# Patient Record
Sex: MALE | Race: WHITE | NOT HISPANIC OR LATINO | Employment: UNEMPLOYED | URBAN - NONMETROPOLITAN AREA
[De-identification: names, ages, dates, MRNs, and addresses within clinical notes are randomized per-mention and may not be internally consistent; named-entity substitution may affect disease eponyms.]

---

## 2018-03-21 ENCOUNTER — HOSPITAL ENCOUNTER (EMERGENCY)
Facility: HOSPITAL | Age: 28
Discharge: STILL ADMITTED OR ADMINISTRATIVE | End: 2018-03-21
Attending: EMERGENCY MEDICINE

## 2018-03-21 ENCOUNTER — HOSPITAL ENCOUNTER (INPATIENT)
Facility: HOSPITAL | Age: 28
LOS: 5 days | Discharge: HOME | End: 2018-03-26
Attending: PSYCHIATRY & NEUROLOGY | Admitting: PSYCHIATRY & NEUROLOGY

## 2018-03-21 VITALS
HEART RATE: 88 BPM | DIASTOLIC BLOOD PRESSURE: 88 MMHG | OXYGEN SATURATION: 100 % | BODY MASS INDEX: 22.4 KG/M2 | SYSTOLIC BLOOD PRESSURE: 134 MMHG | TEMPERATURE: 98.4 F | HEIGHT: 71 IN | WEIGHT: 160 LBS | RESPIRATION RATE: 18 BRPM

## 2018-03-21 DIAGNOSIS — F19.10 SUBSTANCE ABUSE (HCC): Primary | ICD-10-CM

## 2018-03-21 LAB
6-ACETYL MORPHINE: NEGATIVE
ALBUMIN SERPL-MCNC: 4.2 G/DL (ref 3.5–5)
ALBUMIN/GLOB SERPL: 1.4 G/DL (ref 1.5–2.5)
ALP SERPL-CCNC: 55 U/L (ref 40–129)
ALT SERPL W P-5'-P-CCNC: 174 U/L (ref 10–44)
AMPHET+METHAMPHET UR QL: POSITIVE
ANION GAP SERPL CALCULATED.3IONS-SCNC: 5.8 MMOL/L (ref 3.6–11.2)
AST SERPL-CCNC: 69 U/L (ref 10–34)
BARBITURATES UR QL SCN: NEGATIVE
BENZODIAZ UR QL SCN: POSITIVE
BILIRUB SERPL-MCNC: 0.4 MG/DL (ref 0.2–1.8)
BILIRUB UR QL STRIP: NEGATIVE
BUN BLD-MCNC: 11 MG/DL (ref 7–21)
BUN/CREAT SERPL: 10.2 (ref 7–25)
BUPRENORPHINE SERPL-MCNC: POSITIVE NG/ML
CALCIUM SPEC-SCNC: 9.4 MG/DL (ref 7.7–10)
CANNABINOIDS SERPL QL: NEGATIVE
CHLORIDE SERPL-SCNC: 103 MMOL/L (ref 99–112)
CLARITY UR: CLEAR
CO2 SERPL-SCNC: 31.2 MMOL/L (ref 24.3–31.9)
COCAINE UR QL: NEGATIVE
COLOR UR: YELLOW
CREAT BLD-MCNC: 1.08 MG/DL (ref 0.43–1.29)
DEPRECATED RDW RBC AUTO: 39 FL (ref 37–54)
EOSINOPHIL # BLD MANUAL: 0.19 10*3/MM3 (ref 0–0.7)
EOSINOPHIL NFR BLD MANUAL: 4 % (ref 0–5)
ERYTHROCYTE [DISTWIDTH] IN BLOOD BY AUTOMATED COUNT: 12.3 % (ref 11.5–14.5)
ETHANOL BLD-MCNC: <10 MG/DL
ETHANOL UR QL: <0.01 %
GFR SERPL CREATININE-BSD FRML MDRD: 80 ML/MIN/1.73
GLOBULIN UR ELPH-MCNC: 3 GM/DL
GLUCOSE BLD-MCNC: 92 MG/DL (ref 70–110)
GLUCOSE UR STRIP-MCNC: NEGATIVE MG/DL
HCT VFR BLD AUTO: 43.2 % (ref 42–52)
HGB BLD-MCNC: 14.8 G/DL (ref 14–18)
HGB UR QL STRIP.AUTO: NEGATIVE
KETONES UR QL STRIP: NEGATIVE
LEUKOCYTE ESTERASE UR QL STRIP.AUTO: NEGATIVE
LYMPHOCYTES # BLD MANUAL: 2.13 10*3/MM3 (ref 1–3)
LYMPHOCYTES NFR BLD MANUAL: 44 % (ref 21–51)
LYMPHOCYTES NFR BLD MANUAL: 9 % (ref 0–10)
MAGNESIUM SERPL-MCNC: 2.2 MG/DL (ref 1.7–2.6)
MCH RBC QN AUTO: 30.5 PG (ref 27–33)
MCHC RBC AUTO-ENTMCNC: 34.3 G/DL (ref 33–37)
MCV RBC AUTO: 88.9 FL (ref 80–94)
METHADONE UR QL SCN: NEGATIVE
MONOCYTES # BLD AUTO: 0.44 10*3/MM3 (ref 0.1–0.9)
NEUTROPHILS # BLD AUTO: 2.08 10*3/MM3 (ref 1.4–6.5)
NEUTROPHILS NFR BLD MANUAL: 43 % (ref 30–70)
NITRITE UR QL STRIP: NEGATIVE
OPIATES UR QL: POSITIVE
OSMOLALITY SERPL CALC.SUM OF ELEC: 278.4 MOSM/KG (ref 273–305)
OXYCODONE UR QL SCN: NEGATIVE
PCP UR QL SCN: NEGATIVE
PH UR STRIP.AUTO: 7 [PH] (ref 5–8)
PLAT MORPH BLD: NORMAL
PLATELET # BLD AUTO: 190 10*3/MM3 (ref 130–400)
PMV BLD AUTO: 10.5 FL (ref 6–10)
POTASSIUM BLD-SCNC: 3.7 MMOL/L (ref 3.5–5.3)
PROT SERPL-MCNC: 7.2 G/DL (ref 6–8)
PROT UR QL STRIP: NEGATIVE
RBC # BLD AUTO: 4.86 10*6/MM3 (ref 4.7–6.1)
RBC MORPH BLD: NORMAL
SODIUM BLD-SCNC: 140 MMOL/L (ref 135–153)
SP GR UR STRIP: 1.01 (ref 1–1.03)
UROBILINOGEN UR QL STRIP: NORMAL
WBC NRBC COR # BLD: 4.84 10*3/MM3 (ref 4.5–12.5)

## 2018-03-21 PROCEDURE — 80307 DRUG TEST PRSMV CHEM ANLYZR: CPT | Performed by: PHYSICIAN ASSISTANT

## 2018-03-21 PROCEDURE — 80053 COMPREHEN METABOLIC PANEL: CPT | Performed by: PHYSICIAN ASSISTANT

## 2018-03-21 PROCEDURE — 93005 ELECTROCARDIOGRAM TRACING: CPT | Performed by: PSYCHIATRY & NEUROLOGY

## 2018-03-21 PROCEDURE — 85025 COMPLETE CBC W/AUTO DIFF WBC: CPT | Performed by: PHYSICIAN ASSISTANT

## 2018-03-21 PROCEDURE — HZ2ZZZZ DETOXIFICATION SERVICES FOR SUBSTANCE ABUSE TREATMENT: ICD-10-PCS | Performed by: PSYCHIATRY & NEUROLOGY

## 2018-03-21 PROCEDURE — 85007 BL SMEAR W/DIFF WBC COUNT: CPT | Performed by: PHYSICIAN ASSISTANT

## 2018-03-21 PROCEDURE — 83735 ASSAY OF MAGNESIUM: CPT | Performed by: PHYSICIAN ASSISTANT

## 2018-03-21 PROCEDURE — 81003 URINALYSIS AUTO W/O SCOPE: CPT | Performed by: PHYSICIAN ASSISTANT

## 2018-03-21 RX ORDER — LORAZEPAM 2 MG/1
2 TABLET ORAL EVERY 4 HOURS PRN
Status: DISPENSED | OUTPATIENT
Start: 2018-03-22 | End: 2018-03-23

## 2018-03-21 RX ORDER — TRAZODONE HYDROCHLORIDE 50 MG/1
50 TABLET ORAL NIGHTLY
Status: CANCELLED | OUTPATIENT
Start: 2018-03-21

## 2018-03-21 RX ORDER — ECHINACEA PURPUREA EXTRACT 125 MG
2 TABLET ORAL AS NEEDED
Status: DISCONTINUED | OUTPATIENT
Start: 2018-03-21 | End: 2018-03-26 | Stop reason: HOSPADM

## 2018-03-21 RX ORDER — NICOTINE 21 MG/24HR
1 PATCH, TRANSDERMAL 24 HOURS TRANSDERMAL
Status: DISCONTINUED | OUTPATIENT
Start: 2018-03-21 | End: 2018-03-26 | Stop reason: HOSPADM

## 2018-03-21 RX ORDER — CLONIDINE HYDROCHLORIDE 0.1 MG/1
0.1 TABLET ORAL 4 TIMES DAILY PRN
Status: ACTIVE | OUTPATIENT
Start: 2018-03-21 | End: 2018-03-22

## 2018-03-21 RX ORDER — ALUMINA, MAGNESIA, AND SIMETHICONE 2400; 2400; 240 MG/30ML; MG/30ML; MG/30ML
15 SUSPENSION ORAL EVERY 6 HOURS PRN
Status: DISCONTINUED | OUTPATIENT
Start: 2018-03-21 | End: 2018-03-26 | Stop reason: HOSPADM

## 2018-03-21 RX ORDER — CLONIDINE HYDROCHLORIDE 0.1 MG/1
0.1 TABLET ORAL ONCE AS NEEDED
Status: ACTIVE | OUTPATIENT
Start: 2018-03-25 | End: 2018-03-26

## 2018-03-21 RX ORDER — TRAZODONE HYDROCHLORIDE 50 MG/1
50 TABLET ORAL NIGHTLY PRN
Status: DISCONTINUED | OUTPATIENT
Start: 2018-03-21 | End: 2018-03-22 | Stop reason: DRUGHIGH

## 2018-03-21 RX ORDER — LORAZEPAM 1 MG/1
1 TABLET ORAL EVERY 4 HOURS PRN
Status: ACTIVE | OUTPATIENT
Start: 2018-03-24 | End: 2018-03-25

## 2018-03-21 RX ORDER — TRAZODONE HYDROCHLORIDE 50 MG/1
50 TABLET ORAL NIGHTLY
COMMUNITY

## 2018-03-21 RX ORDER — LOPERAMIDE HYDROCHLORIDE 2 MG/1
2 CAPSULE ORAL 4 TIMES DAILY PRN
Status: DISCONTINUED | OUTPATIENT
Start: 2018-03-21 | End: 2018-03-26 | Stop reason: HOSPADM

## 2018-03-21 RX ORDER — BENZONATATE 100 MG/1
100 CAPSULE ORAL 3 TIMES DAILY PRN
Status: DISCONTINUED | OUTPATIENT
Start: 2018-03-21 | End: 2018-03-26 | Stop reason: HOSPADM

## 2018-03-21 RX ORDER — LORAZEPAM 2 MG/1
2 TABLET ORAL
Status: COMPLETED | OUTPATIENT
Start: 2018-03-22 | End: 2018-03-22

## 2018-03-21 RX ORDER — LORAZEPAM 0.5 MG/1
0.5 TABLET ORAL EVERY 4 HOURS PRN
Status: ACTIVE | OUTPATIENT
Start: 2018-03-25 | End: 2018-03-26

## 2018-03-21 RX ORDER — IBUPROFEN 600 MG/1
600 TABLET ORAL EVERY 6 HOURS PRN
Status: DISCONTINUED | OUTPATIENT
Start: 2018-03-21 | End: 2018-03-26 | Stop reason: HOSPADM

## 2018-03-21 RX ORDER — CYCLOBENZAPRINE HCL 10 MG
10 TABLET ORAL 3 TIMES DAILY PRN
Status: DISCONTINUED | OUTPATIENT
Start: 2018-03-21 | End: 2018-03-26 | Stop reason: HOSPADM

## 2018-03-21 RX ORDER — BUSPIRONE HYDROCHLORIDE 15 MG/1
15 TABLET ORAL 2 TIMES DAILY
COMMUNITY

## 2018-03-21 RX ORDER — CLONIDINE HYDROCHLORIDE 0.1 MG/1
0.1 TABLET ORAL 4 TIMES DAILY PRN
Status: ACTIVE | OUTPATIENT
Start: 2018-03-22 | End: 2018-03-23

## 2018-03-21 RX ORDER — DICYCLOMINE HYDROCHLORIDE 10 MG/1
10 CAPSULE ORAL 3 TIMES DAILY PRN
Status: DISCONTINUED | OUTPATIENT
Start: 2018-03-21 | End: 2018-03-26 | Stop reason: HOSPADM

## 2018-03-21 RX ORDER — ONDANSETRON 4 MG/1
4 TABLET, FILM COATED ORAL EVERY 6 HOURS PRN
Status: DISCONTINUED | OUTPATIENT
Start: 2018-03-21 | End: 2018-03-26 | Stop reason: HOSPADM

## 2018-03-21 RX ORDER — CLONIDINE HYDROCHLORIDE 0.1 MG/1
0.1 TABLET ORAL 3 TIMES DAILY PRN
Status: ACTIVE | OUTPATIENT
Start: 2018-03-23 | End: 2018-03-24

## 2018-03-21 RX ORDER — LORAZEPAM 2 MG/1
2 TABLET ORAL EVERY 6 HOURS PRN
Status: DISPENSED | OUTPATIENT
Start: 2018-03-21 | End: 2018-03-22

## 2018-03-21 RX ORDER — MULTIVITAMIN
1 TABLET ORAL DAILY
Status: DISCONTINUED | OUTPATIENT
Start: 2018-03-21 | End: 2018-03-26 | Stop reason: HOSPADM

## 2018-03-21 RX ORDER — HYDROXYZINE 50 MG/1
50 TABLET, FILM COATED ORAL EVERY 6 HOURS PRN
Status: DISCONTINUED | OUTPATIENT
Start: 2018-03-21 | End: 2018-03-26 | Stop reason: HOSPADM

## 2018-03-21 RX ORDER — THIAMINE HCL 50 MG
100 TABLET ORAL DAILY
Status: DISCONTINUED | OUTPATIENT
Start: 2018-03-21 | End: 2018-03-26 | Stop reason: HOSPADM

## 2018-03-21 RX ORDER — LORAZEPAM 0.5 MG/1
0.5 TABLET ORAL
Status: COMPLETED | OUTPATIENT
Start: 2018-03-25 | End: 2018-03-25

## 2018-03-21 RX ORDER — CLONIDINE HYDROCHLORIDE 0.1 MG/1
0.1 TABLET ORAL 2 TIMES DAILY PRN
Status: ACTIVE | OUTPATIENT
Start: 2018-03-24 | End: 2018-03-25

## 2018-03-21 RX ORDER — LORAZEPAM 2 MG/1
2 TABLET ORAL EVERY 6 HOURS PRN
Status: DISCONTINUED | OUTPATIENT
Start: 2018-03-21 | End: 2018-03-21

## 2018-03-21 RX ORDER — LORAZEPAM 1 MG/1
1 TABLET ORAL
Status: COMPLETED | OUTPATIENT
Start: 2018-03-24 | End: 2018-03-24

## 2018-03-21 RX ORDER — FAMOTIDINE 20 MG/1
20 TABLET, FILM COATED ORAL 2 TIMES DAILY PRN
Status: DISCONTINUED | OUTPATIENT
Start: 2018-03-21 | End: 2018-03-26 | Stop reason: HOSPADM

## 2018-03-21 RX ADMIN — Medication 1 TABLET: at 21:10

## 2018-03-21 RX ADMIN — LORAZEPAM 2 MG: 2 TABLET ORAL at 22:15

## 2018-03-21 RX ADMIN — THIAMINE HCL (VITAMIN B1) 50 MG TABLET 100 MG: at 21:10

## 2018-03-21 RX ADMIN — TRAZODONE HYDROCHLORIDE 50 MG: 50 TABLET ORAL at 21:10

## 2018-03-21 RX ADMIN — CYCLOBENZAPRINE HYDROCHLORIDE 10 MG: 10 TABLET, FILM COATED ORAL at 21:10

## 2018-03-21 RX ADMIN — IBUPROFEN 600 MG: 600 TABLET ORAL at 21:10

## 2018-03-21 RX ADMIN — HYDROXYZINE HYDROCHLORIDE 50 MG: 50 TABLET ORAL at 21:10

## 2018-03-21 NOTE — ED PROVIDER NOTES
Subjective     Mental Health Problem   Presenting symptoms comment:  Substance abuse  Degree of incapacity (severity):  Moderate  Onset quality:  Gradual  Duration: Several years.  Timing:  Constant  Progression:  Worsening  Chronicity:  Chronic  Context: alcohol use and drug abuse (eroin, Suboxone,methamphetamine, cocaine)    Associated symptoms: feelings of worthlessness    Associated symptoms: no abdominal pain and no chest pain        Review of Systems   Constitutional: Negative.  Negative for fever.   HENT: Negative.    Respiratory: Negative.    Cardiovascular: Negative.  Negative for chest pain.   Gastrointestinal: Negative.  Negative for abdominal pain.   Endocrine: Negative.    Genitourinary: Negative.  Negative for dysuria.   Skin: Negative.    Neurological: Negative.    Psychiatric/Behavioral: Negative.    All other systems reviewed and are negative.      Past Medical History:   Diagnosis Date   • Anxiety        No Known Allergies    History reviewed. No pertinent surgical history.    Family History   Problem Relation Age of Onset   • Alcohol abuse Mother    • Alcohol abuse Father    • Drug abuse Father        Social History     Social History   • Marital status: Single     Social History Main Topics   • Smoking status: Current Every Day Smoker     Packs/day: 1.00     Types: Cigarettes   • Alcohol use Yes      Comment: for approx 3 months a 6 pack or more and 1 pint of bourban a day   • Drug use:      Types: Cocaine, Methamphetamines, Benzodiazepines      Comment: suboxone, etoh   • Sexual activity: Not Currently     Partners: Female     Other Topics Concern   • Not on file           Objective   Physical Exam   Constitutional: He is oriented to person, place, and time. He appears well-developed and well-nourished. No distress.   HENT:   Head: Normocephalic and atraumatic.   Right Ear: External ear normal.   Left Ear: External ear normal.   Nose: Nose normal.   Eyes: Conjunctivae and EOM are normal. Pupils  are equal, round, and reactive to light.   Neck: Normal range of motion. Neck supple. No JVD present. No tracheal deviation present.   Cardiovascular: Normal rate, regular rhythm and normal heart sounds.    No murmur heard.  Pulmonary/Chest: Effort normal and breath sounds normal. No respiratory distress. He has no wheezes.   Abdominal: Soft. Bowel sounds are normal. There is no tenderness.   Musculoskeletal: Normal range of motion. He exhibits no edema or deformity.   Neurological: He is alert and oriented to person, place, and time. No cranial nerve deficit.   Skin: Skin is warm and dry. No rash noted. He is not diaphoretic. No erythema. No pallor.   Noninfected track marks.   Psychiatric: He has a normal mood and affect. His behavior is normal. Thought content normal.   Nursing note and vitals reviewed.      Procedures         ED Course  ED Course                  MDM  Number of Diagnoses or Management Options  Substance abuse: established and worsening     Amount and/or Complexity of Data Reviewed  Clinical lab tests: reviewed and ordered  Discuss the patient with other providers: yes    Risk of Complications, Morbidity, and/or Mortality  Presenting problems: moderate        Final diagnoses:   Substance abuse            LUIS Trotter  03/21/18 0200

## 2018-03-21 NOTE — NURSING NOTE
[facility] will not let us select DR Cobb for orders do to set up error, contacted Dr Olivas, he stated to put orders under his name for now

## 2018-03-22 LAB
HAV IGM SERPL QL IA: ABNORMAL
HBV CORE IGM SERPL QL IA: ABNORMAL
HBV SURFACE AG SERPL QL IA: ABNORMAL
HCV AB SER DONR QL: REACTIVE
HIV1+2 AB SER QL: NORMAL

## 2018-03-22 PROCEDURE — 99223 1ST HOSP IP/OBS HIGH 75: CPT | Performed by: PSYCHIATRY & NEUROLOGY

## 2018-03-22 PROCEDURE — 87522 HEPATITIS C REVRS TRNSCRPJ: CPT | Performed by: PSYCHIATRY & NEUROLOGY

## 2018-03-22 PROCEDURE — 93010 ELECTROCARDIOGRAM REPORT: CPT | Performed by: INTERNAL MEDICINE

## 2018-03-22 PROCEDURE — G0432 EIA HIV-1/HIV-2 SCREEN: HCPCS | Performed by: PSYCHIATRY & NEUROLOGY

## 2018-03-22 PROCEDURE — 80074 ACUTE HEPATITIS PANEL: CPT | Performed by: PSYCHIATRY & NEUROLOGY

## 2018-03-22 RX ORDER — ROPINIROLE 0.25 MG/1
0.25 TABLET, FILM COATED ORAL EVERY 8 HOURS SCHEDULED
Status: DISCONTINUED | OUTPATIENT
Start: 2018-03-22 | End: 2018-03-23

## 2018-03-22 RX ORDER — TRAZODONE HYDROCHLORIDE 50 MG/1
50 TABLET ORAL NIGHTLY
Status: DISCONTINUED | OUTPATIENT
Start: 2018-03-22 | End: 2018-03-26 | Stop reason: HOSPADM

## 2018-03-22 RX ORDER — BUPRENORPHINE 2 MG/1
2 TABLET SUBLINGUAL DAILY
Status: COMPLETED | OUTPATIENT
Start: 2018-03-23 | End: 2018-03-23

## 2018-03-22 RX ORDER — TRAZODONE HYDROCHLORIDE 50 MG/1
50 TABLET ORAL NIGHTLY
Status: CANCELLED | OUTPATIENT
Start: 2018-03-22

## 2018-03-22 RX ORDER — BUPRENORPHINE 2 MG/1
4 TABLET SUBLINGUAL ONCE
Status: COMPLETED | OUTPATIENT
Start: 2018-03-22 | End: 2018-03-22

## 2018-03-22 RX ADMIN — BUPRENORPHINE HCL 4 MG: 2 TABLET SUBLINGUAL at 12:15

## 2018-03-22 RX ADMIN — HYDROXYZINE HYDROCHLORIDE 50 MG: 50 TABLET ORAL at 22:17

## 2018-03-22 RX ADMIN — Medication 1 TABLET: at 09:40

## 2018-03-22 RX ADMIN — ROPINIROLE 0.25 MG: 0.25 TABLET ORAL at 14:14

## 2018-03-22 RX ADMIN — DICYCLOMINE HYDROCHLORIDE 10 MG: 10 CAPSULE ORAL at 09:42

## 2018-03-22 RX ADMIN — IBUPROFEN 600 MG: 600 TABLET ORAL at 09:42

## 2018-03-22 RX ADMIN — TRAZODONE HYDROCHLORIDE 50 MG: 50 TABLET ORAL at 21:19

## 2018-03-22 RX ADMIN — LORAZEPAM 2 MG: 2 TABLET ORAL at 09:43

## 2018-03-22 RX ADMIN — ROPINIROLE 0.25 MG: 0.25 TABLET ORAL at 21:19

## 2018-03-22 RX ADMIN — HYDROXYZINE HYDROCHLORIDE 50 MG: 50 TABLET ORAL at 09:42

## 2018-03-22 RX ADMIN — BUSPIRONE HYDROCHLORIDE 15 MG: 10 TABLET ORAL at 09:40

## 2018-03-22 RX ADMIN — LORAZEPAM 2 MG: 2 TABLET ORAL at 14:16

## 2018-03-22 RX ADMIN — THIAMINE HCL (VITAMIN B1) 50 MG TABLET 100 MG: at 09:41

## 2018-03-22 RX ADMIN — CYCLOBENZAPRINE HYDROCHLORIDE 10 MG: 10 TABLET, FILM COATED ORAL at 09:42

## 2018-03-22 RX ADMIN — LORAZEPAM 2 MG: 2 TABLET ORAL at 21:19

## 2018-03-22 RX ADMIN — BUSPIRONE HYDROCHLORIDE 15 MG: 10 TABLET ORAL at 21:19

## 2018-03-22 RX ADMIN — IBUPROFEN 600 MG: 600 TABLET ORAL at 22:17

## 2018-03-22 NOTE — DISCHARGE INSTR - APPOINTMENTS
[facility]  [address] [address]  [phone]        [facility] [address]  [phone]    Arrive at 8:30 am on Tuesday March 27th, 2018 to see Kari Cristina  Bring ID and medical card    You may also want to contact [facility] at [phone] for another residential option.

## 2018-03-22 NOTE — H&P
"INITIAL PSYCHIATRIC HISTORY & PHYSICAL    Patient Identification:  Name:   Van Heard  Age:   28 y.o.  Sex:   male  :   1990  MRN:   [id number]  Visit Number:   [id number]  Primary Care Physician:   No Known Provider    SUBJECTIVE  \" time to get clean\"      CC: polysubstance abuse, etoh abuse     HPI: Van Heard is a 28 y.o. male who was admitted on 3/21/2018 with complaints of substance abuse, alcohol use.  Patient presented to [facility] requesting assistance with detoxification. Patient reports long history of substance abuse beginning in teens with use va  rying through the years, intermittent at times . Patient reports 8 month period of sobriety last year,  relapsing about 6 months ago with use escalating in past 3 months. Report for about 6 months he's  he's been using IV Heroin daily ( as much as available) , last use, 2 days ago.  He also reports for past few months he's been using Xanax up to 10 bars via intranasal, po daily, last use, 2 days ago.                 Patient reports history of daily etoh use and for the past 3 months use escalating drinking at least 6-12  beer and pint of bourbon daily, last use,  2 days ago. Patient has made several attempts to obtain sobriety  unsuccessfully due to withdrawal symptoms, stress, craving . He reports several previous inpatient admissions and identifies a 19 month period of sobriety from 2883-8799.  Patient has experienced numerous negative consequences of use including financial, relationship, reports recently losing fiance and home, his vehicle, r/t use.  . Patient reports began use at age 12, began using  Heroin at 14 .  Patient reports desire to obtain sobriety possible continue rehab at discharge. He denies suicidal or homicidal ideation.  Denies hallucination. Patient has noted previous suicidal attempt in  he now identifies as \"call for help\" at the time. Patient reports poor sleep and appetite.Denies symptoms of ocd, leon, " "paranoia or ptsd. He was admitted to the [facility] Unit for safety and further stabilization.       PAST PSYCHIATRIC HX:   Reports history of several inpatient admissions inlcuding  to [facility]   Patient report previous suicidal attempt via cutting wrist in 2009. Reports rx of adhd, childhood, anxiety, depression.     SUBSTANCE USE HX:  UDS is positive for amphetamine, benzodiazepine, buprenorphine, opiate an See hpi for current use.     SOCIAL HX:   Patient is Single, no children born and raised in [place] , now lives in [place] . Parents are , His Father in [place], Mother in [place]. He has one Sibling . Currently resides with Fiance' works in sale, [employer]. No  experience. Denies legal issues.  He is high school and college educated. Restorationist preference is Tenriism. Report He enjoys being outside in nature, hiking     Denies seizure history . Reports history of dt's (hallucination) in past   Past Medical History:   Diagnosis Date   • ADHD (attention deficit hyperactivity disorder)     childhood   • Alcohol abuse    • Anxiety    • Depression    • Substance abuse    • Suicide attempt     \"I slit my wrist.\" 2009   • Withdrawal symptoms, drug or narcotic        Past Surgical History:   Procedure Laterality Date   • HERNIA REPAIR  2002   • WISDOM TOOTH EXTRACTION  2009       Family History   Problem Relation Age of Onset   • Alcohol abuse Mother    • Alcohol abuse Father    • Drug abuse Father          Prescriptions Prior to Admission   Medication Sig Dispense Refill Last Dose   • busPIRone (BUSPAR) 15 MG tablet Take 15 mg by mouth 2 (Two) Times a Day.   3/21/2018 at 1600   • traZODone (DESYREL) 50 MG tablet Take 50 mg by mouth Every Night.   3/21/2018 at 1600       Reviewed available past medical and psychiatric records.    ALLERGIES:  Review of patient's allergies indicates no known allergies.    Temp:  [98 °F (36.7 °C)-99.2 °F (37.3 °C)] 98.4 °F (36.9 °C)  Heart Rate:  " [] 109  Resp:  [18-20] 20  BP: ()/(51-88) 163/88    REVIEW OF SYSTEMS:  Review of Systems   Constitutional: Positive for chills, diaphoresis and fatigue.   Eyes: Negative.    Respiratory: Negative.    Cardiovascular: Negative.    Gastrointestinal: Negative.    Endocrine: Negative.    Genitourinary: Negative.    Musculoskeletal: Positive for myalgias.   Skin: Negative.    Allergic/Immunologic: Negative.    Neurological: Positive for headaches.   Hematological: Negative.    Psychiatric/Behavioral: Positive for dysphoric mood.      See HPI for psychiatric ROS  OBJECTIVE    PHYSICAL EXAM:  Physical Exam   Constitutional: He is oriented to person, place, and time. He appears well-developed and well-nourished.   HENT:   Head: Normocephalic and atraumatic.   Right Ear: External ear normal.   Left Ear: External ear normal.   Nose: Nose normal.   Mouth/Throat: Oropharynx is clear and moist.   Eyes: Conjunctivae and EOM are normal. Pupils are equal, round, and reactive to light.   Neck: Normal range of motion. Neck supple.   Cardiovascular: Normal rate, regular rhythm and normal heart sounds.    Pulmonary/Chest: Effort normal and breath sounds normal.   Abdominal: Soft. Bowel sounds are normal.   Musculoskeletal: Normal range of motion.   Neurological: He is alert and oriented to person, place, and time. He has normal reflexes. No cranial nerve deficit.   Skin: Skin is warm and dry.   Vitals reviewed.      MENTAL STATUS EXAM:    Hygiene:   fair  Cooperation:  Cooperative  Eye Contact:  Fair  Psychomotor Behavior:  Restless  Affect: anxious   Hopelessness: Denies  Speech: talkative   Thought Progress: linear   Thought Content: mood congruent   Suicidal:  Denies   Homicidal:  Denies   Hallucinations:  Denies   Delusion:  No delusional content noted   Memory: deficits   Orientation:  Person, place, time and situation   Reliability:fair   Insight:  Fair   Judgement:  Poor   Impulse Control: poor   Physical/Medical  Issues:  See medical list       Imaging Results (last 24 hours)     ** No results found for the last 24 hours. **           ECG/EMG Results (most recent)     Procedure Component Value Units Date/Time    ECG 12 Lead [[id number]] Collected:  03/22/18 0208     Updated:  03/22/18 1442    Narrative:       Test Reason : Potential adverse reaction to medications.  Blood Pressure : **/** mmHG  Vent. Rate : 051 BPM     Atrial Rate : 051 BPM     P-R Int : 116 ms          QRS Dur : 122 ms      QT Int : 458 ms       P-R-T Axes : 046 015 041 degrees     QTc Int : 422 ms    Sinus bradycardia  Nonspecific intraventricular conduction delay  Borderline ECG  No previous ECGs available  nonspecific ST elevation in the precordial leads, clinical correlation  required.  Confirmed by Jerry Cobb ([id number]) on 3/22/2018 2:41:59 PM    Referred By:  MARGY           Confirmed By:Jerry Cobb           Lab Results   Component Value Date    GLUCOSE 92 03/21/2018    BUN 11 03/21/2018    CREATININE 1.08 03/21/2018    EGFRIFNONA 80 03/21/2018    BCR 10.2 03/21/2018    CO2 31.2 03/21/2018    CALCIUM 9.4 03/21/2018    ALBUMIN 4.20 03/21/2018    LABIL2 1.4 (L) 03/21/2018    AST 69 (H) 03/21/2018     (H) 03/21/2018       Lab Results   Component Value Date    WBC 4.84 03/21/2018    HGB 14.8 03/21/2018    HCT 43.2 03/21/2018    MCV 88.9 03/21/2018     03/21/2018       Pain Management Panel     Pain Management Panel Latest Ref Rng & Units 3/21/2018    AMPHETAMINES SCREEN, URINE Negative Positive(A)    BARBITURATES SCREEN Negative Negative    BENZODIAZEPINE SCREEN, URINE Negative Positive(A)    BUPRENORPHINE Negative Positive(A)    COCAINE SCREEN, URINE Negative Negative    METHADONE SCREEN, URINE Negative Negative          Brief Urine Lab Results  (Last result in the past 365 days)      Color   Clarity   Blood   Leuk Est   Nitrite   Protein   CREAT   Urine HCG        03/21/18 1713 Yellow Clear Negative Negative Negative Negative                Reviewed labs and studies done with this admission.       ASSESSMENT & PLAN:      Patient Active Problem List   Diagnosis Code   • Benzodiazepine withdrawal with perceptual disturbance    The patient is on an Ativan taper.  Plan on referral to long-term treatment.   F13.232   • Opioid dependence with withdrawal    Plan: Begin brief Subutex taper starting with 4 mg today and 2 mg tomorrow and otherwise treated supportively.   F11.23   • Alcohol dependence with withdrawal    Continue Ativan detox  F10.239   Methamphetamine use disorder, abuse  Plan: Monitor and treat supportively    Hepatitis C  Plan: The patient was informed of his positive result for hepatitis C antibody.  I discussed the prognosis and treatment for hepatitis C and offered additional information.  He was agreeable to getting a quantitative hepatitis C RNA.    The patient has been admitted for safety and stabilization.  Patient will be monitored for suicidality daily and maintained on 30 minute rounds for safety. .  The patient will have individual and group therapy with a master's level therapist. A master treatment plan will be developed and agreed upon by the patient and his/her treatment team.  The patient's estimated length of stay in the hospital is 5-7 days.    This note was generated using a scribe,  Linda Vigil RN  The work documented in this note was completed, reviewed, and approved by the attending psychiatrist as designated Dr. HEMA crane.

## 2018-03-22 NOTE — PLAN OF CARE
Problem: Patient Care Overview  Goal: Plan of Care Review  Outcome: Ongoing (interventions implemented as appropriate)   03/22/18 1510   Coping/Psychosocial   Plan of Care Reviewed With patient   Coping/Psychosocial   Patient Agreement with Plan of Care agrees   Plan of Care Review   Progress no change     Goal: Individualization and Mutuality  Outcome: Ongoing (interventions implemented as appropriate)   03/22/18 1458   Personal Strengths/Vulnerabilities   Patient Personal Strengths expressive of emotions;expressive of needs;positive educational history;positive vocational history;motivated for recovery;motivated for treatment;resilient;resourceful   Patient Vulnerabilities ineffective coping, substance abuse     Goal: Discharge Needs Assessment  Outcome: Ongoing (interventions implemented as appropriate)   03/21/18 2004 03/22/18 1510   Discharge Needs Assessment   Readmission Within the Last 30 Days --  no previous admission in last 30 days   Concerns to be Addressed --  substance/tobacco abuse/use   Patient/Family Anticipates Transition to --  inpatient rehabilitation facility   Patient/Family Anticipated Services at Transition --  mental health services;rehabilitation services   Transportation Concerns --  car, none   Transportation Anticipated family or friend will provide;agency --    Patient's Choice of Community Agency(s) --  [facility]-consent signed   Current Discharge Risk --  substance use/abuse   Discharge Coordination/Progress --  Patient has insurance for medication and may need assistance with transportation upon stabilization.   Discharge Needs Assessment,    Outpatient/Agency/Support Group Needs --  12 step program (specify);residential services   Anticipated Discharge Disposition --  inpatient rehab facility     DATA: Met with patient initially to complete initial assessment, social history, integrated summary, review care planning and disposition discussion. Patient is a 28 year old   "male [place].  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  He reports he started \"partying\" when he was 12 and his parents .  He reported that he has been to [facility] in the past for detox and reports that he has been seen at [facility] in [place] on an outpatient basis.  He reports struggling for many years with substance abuse.  He reports that over the last 3 months his drug abuse has become more severe.  He reports using Heroin, Xanax, Valium, Meth and Alcohol daily.  He reports that he is college educated and was engaged up until this past Saturday.  He reports that if he does not go to residential he will have to go stay with his father as he can no longer stay with his fiancee.  [facility] and [facility] today.  He signed consent for [facility] and reports he has to attend residential treatment and get back on track.     ASSESSMENT:  Patient presents with acute withdrawal from daily substance and alcohol misuse.    Patient reports acute increase in depression and anxiety.  Patient is a danger to self and requires further hospitalization for stabilization of symptoms.    PLAN:  Patient will continue stabilization.  Patient will engage in individual and group therapy to address coping and review crisis safety planning as well as appropriate disposition.  Patient is verbalizing a plan currently to attend residential treatment following his stabilization.  Consent for [facility] signed.      "

## 2018-03-22 NOTE — PLAN OF CARE
Problem: Patient Care Overview  Goal: Plan of Care Review  Outcome: Ongoing (interventions implemented as appropriate)   03/22/18 1631   Coping/Psychosocial   Plan of Care Reviewed With patient   Coping/Psychosocial   Patient Agreement with Plan of Care agrees   Plan of Care Review   Progress improving   OTHER   Outcome Summary Pt rated anxiety an 8 and depresson a 5. Pt was not suicidal or homicidal and not having any hallucinations. Pt stated he was sleeping well but not eating. Pt rated his craving a 10 and stated he was having multiple withdrawl symptoms.       Problem: Overarching Goals (Adult)  Goal: Adheres to Safety Considerations for Self and Others  Outcome: Ongoing (interventions implemented as appropriate)    Goal: Optimized Coping Skills in Response to Life Stressors  Outcome: Ongoing (interventions implemented as appropriate)    Goal: Develops/Participates in Therapeutic Protection to Support Successful Transition  Outcome: Ongoing (interventions implemented as appropriate)

## 2018-03-22 NOTE — PLAN OF CARE
Problem: Patient Care Overview  Goal: Plan of Care Review  Outcome: Ongoing (interventions implemented as appropriate)   03/22/18 0014   Coping/Psychosocial   Plan of Care Reviewed With patient   Coping/Psychosocial   Patient Agreement with Plan of Care agrees   Plan of Care Review   Progress no change   OTHER   Outcome Summary pt new admit this shift. Pt calm and cooperative       Problem: Overarching Goals (Adult)  Goal: Adheres to Safety Considerations for Self and Others  Outcome: Ongoing (interventions implemented as appropriate)    Goal: Optimized Coping Skills in Response to Life Stressors  Outcome: Ongoing (interventions implemented as appropriate)    Goal: Develops/Participates in Therapeutic [place] to Support Successful Transition  Outcome: Ongoing (interventions implemented as appropriate)

## 2018-03-23 PROBLEM — F11.23 OPIOID DEPENDENCE WITH WITHDRAWAL (HCC): Status: ACTIVE | Noted: 2018-03-23

## 2018-03-23 PROBLEM — F10.239 ALCOHOL DEPENDENCE WITH WITHDRAWAL (HCC): Status: ACTIVE | Noted: 2018-03-23

## 2018-03-23 PROBLEM — F13.932 BENZODIAZEPINE WITHDRAWAL WITH PERCEPTUAL DISTURBANCE (HCC): Status: ACTIVE | Noted: 2018-03-21

## 2018-03-23 PROCEDURE — 99232 SBSQ HOSP IP/OBS MODERATE 35: CPT | Performed by: PSYCHIATRY & NEUROLOGY

## 2018-03-23 RX ORDER — BUPRENORPHINE 2 MG/1
2 TABLET SUBLINGUAL ONCE
Status: COMPLETED | OUTPATIENT
Start: 2018-03-23 | End: 2018-03-23

## 2018-03-23 RX ORDER — OLANZAPINE 2.5 MG/1
2.5 TABLET ORAL 2 TIMES DAILY PRN
Status: DISCONTINUED | OUTPATIENT
Start: 2018-03-23 | End: 2018-03-26 | Stop reason: HOSPADM

## 2018-03-23 RX ORDER — BUPRENORPHINE 2 MG/1
2 TABLET SUBLINGUAL DAILY
Status: COMPLETED | OUTPATIENT
Start: 2018-03-24 | End: 2018-03-24

## 2018-03-23 RX ORDER — ROPINIROLE 0.25 MG/1
0.5 TABLET, FILM COATED ORAL EVERY 8 HOURS SCHEDULED
Status: DISCONTINUED | OUTPATIENT
Start: 2018-03-23 | End: 2018-03-26 | Stop reason: HOSPADM

## 2018-03-23 RX ADMIN — Medication 1 TABLET: at 08:35

## 2018-03-23 RX ADMIN — THIAMINE HCL (VITAMIN B1) 50 MG TABLET 100 MG: at 08:35

## 2018-03-23 RX ADMIN — BUPRENORPHINE HCL 2 MG: 2 TABLET SUBLINGUAL at 12:57

## 2018-03-23 RX ADMIN — ROPINIROLE 0.5 MG: 0.25 TABLET ORAL at 21:16

## 2018-03-23 RX ADMIN — BUSPIRONE HYDROCHLORIDE 15 MG: 10 TABLET ORAL at 08:35

## 2018-03-23 RX ADMIN — CYCLOBENZAPRINE HYDROCHLORIDE 10 MG: 10 TABLET, FILM COATED ORAL at 08:37

## 2018-03-23 RX ADMIN — ROPINIROLE 0.5 MG: 0.25 TABLET ORAL at 14:37

## 2018-03-23 RX ADMIN — LORAZEPAM 1.5 MG: 1 TABLET ORAL at 21:17

## 2018-03-23 RX ADMIN — TRAZODONE HYDROCHLORIDE 50 MG: 50 TABLET ORAL at 21:17

## 2018-03-23 RX ADMIN — LORAZEPAM 1.5 MG: 1 TABLET ORAL at 08:37

## 2018-03-23 RX ADMIN — IBUPROFEN 600 MG: 600 TABLET ORAL at 08:37

## 2018-03-23 RX ADMIN — MAGNESIUM HYDROXIDE 10 ML: 2400 SUSPENSION ORAL at 21:17

## 2018-03-23 RX ADMIN — CYCLOBENZAPRINE HYDROCHLORIDE 10 MG: 10 TABLET, FILM COATED ORAL at 21:17

## 2018-03-23 RX ADMIN — ROPINIROLE 0.25 MG: 0.25 TABLET ORAL at 08:35

## 2018-03-23 RX ADMIN — BUPRENORPHINE HCL 2 MG: 2 TABLET SUBLINGUAL at 08:37

## 2018-03-23 RX ADMIN — LORAZEPAM 1.5 MG: 1 TABLET ORAL at 14:39

## 2018-03-23 RX ADMIN — BUSPIRONE HYDROCHLORIDE 15 MG: 10 TABLET ORAL at 21:17

## 2018-03-23 NOTE — PROGRESS NOTES
"INPATIENT PSYCHIATRIC PROGRESS NOTE    Name:  Van Heard  :  1990  MRN:  [id number]  Visit Number:  [id number]  Length of stay:  2    SUBJECTIVE  CC: Follow-up for withdrawal    INTERVAL HISTORY:  Van was seen for withdrawal today.  He had multiple complaints of withdrawal today.  He reported getting frustrated and wanted to know what medicines he had available as when necessary's.  He says when he goes to ask for medications the nurses ask him what his symptoms are and he doesn't know what to tell them and says \"everything hurts, I don't know.\"  He repeatedly said he is not looking for medication however, he has been observed on the unit repeatedly asking for medications.  The patient also reports his anxiety is extremely high.  We discussed several treatment options and he is agreeable to a trial of Zyprexa.  This is also because he was experiencing clear bugs crawling on his wall yesterday and last night.  Denies suicidal or homicidal thoughts.    Sleep: good  Withdrawal sx:see ROS  Craving: 10/10    Review of Systems   Constitutional: Positive for chills and diaphoresis.   HENT: Positive for rhinorrhea and sneezing.    Respiratory: Negative.    Cardiovascular: Negative.    Gastrointestinal: Positive for abdominal pain, constipation and nausea. Negative for diarrhea and vomiting.   Musculoskeletal: Positive for myalgias.   Neurological: Positive for tremors and headaches.   Psychiatric/Behavioral: Positive for hallucinations. Negative for sleep disturbance. The patient is nervous/anxious.        OBJECTIVE    Temp:  [96.9 °F (36.1 °C)-98.6 °F (37 °C)] 98 °F (36.7 °C)  Heart Rate:  [] 68  Resp:  [18] 18  BP: (105-154)/(51-82) 111/51    MENTAL STATUS EXAM:  Appearance:Casually dressed, good hygeine, kept hoodie on head   Cooperation:Cooperative  Psychomotor: No psychomotor agitation/retardation, No EPS, No motor tics  Speech-normal rate, amount.  Mood \"anxious\"   Affect- constricted,  Thought " Content-goal directed, no delusional material present  Thought process-linear, organized.  Suicidality: No SI  Homicidality: No HI  Perception: No AH/VH  Insight- limited  Judgement- limited    Lab Results (last 24 hours)     Procedure Component Value Units Date/Time    Hepatitis C RNA, Quantitative, PCR (graph) [[id number]] Collected:  18 1624    Specimen:  Blood Updated:  18 1630             Imaging Results (last 24 hours)     ** No results found for the last 24 hours. **             ECG/EMG Results (most recent)     Procedure Component Value Units Date/Time    ECG 12 Lead [[id number]] Collected:  18 0208     Updated:  18 1442    Narrative:       Test Reason : Potential adverse reaction to medications.  Blood Pressure : **/** mmHG  Vent. Rate : 051 BPM     Atrial Rate : 051 BPM     P-R Int : 116 ms          QRS Dur : 122 ms      QT Int : 458 ms       P-R-T Axes : 046 015 041 degrees     QTc Int : 422 ms    Sinus bradycardia  Nonspecific intraventricular conduction delay  Borderline ECG  No previous ECGs available  nonspecific ST elevation in the precordial leads, clinical correlation  required.  Confirmed by Jerry Cobb () on 3/22/2018 2:41:59 PM    Referred By:  MARGY           Confirmed By:Jerry Cobb           ALLERGIES: Review of patient's allergies indicates no known allergies.      Current Facility-Administered Medications:   •  aluminum-magnesium hydroxide-simethicone (MAALOX MAX) 400-400-40 MG/5ML suspension 15 mL, 15 mL, Oral, Q6H PRN, Columba Jones MD  •  benzonatate (TESSALON) capsule 100 mg, 100 mg, Oral, TID PRN, Columba Jones MD  •  buprenorphine (SUBUTEX) SL tablet 2 mg, 2 mg, Sublingual, Once **FOLLOWED BY** [START ON 3/24/2018] buprenorphine (SUBUTEX) SL tablet 2 mg, 2 mg, Sublingual, Daily, Thierno Owens MD  •  busPIRone (BUSPAR) tablet 15 mg, 15 mg, Oral, BID, Thierno Owens MD, 15 mg at 18 0835  •  [] CloNIDine (CATAPRES) tablet 0.1 mg,  0.1 mg, Oral, 4x Daily PRN **FOLLOWED BY** CloNIDine (CATAPRES) tablet 0.1 mg, 0.1 mg, Oral, TID PRN **FOLLOWED BY** [START ON 3/24/2018] CloNIDine (CATAPRES) tablet 0.1 mg, 0.1 mg, Oral, BID PRN **FOLLOWED BY** [START ON 3/25/2018] CloNIDine (CATAPRES) tablet 0.1 mg, 0.1 mg, Oral, Once PRN, Columba Jones MD  •  cyclobenzaprine (FLEXERIL) tablet 10 mg, 10 mg, Oral, TID PRN, Columba Jones MD, 10 mg at 18 0837  •  dicyclomine (BENTYL) capsule 10 mg, 10 mg, Oral, TID PRN, Columba Jones MD, 10 mg at 18 0942  •  famotidine (PEPCID) tablet 20 mg, 20 mg, Oral, BID PRN, Columba Jones MD  •  hydrOXYzine (ATARAX) tablet 50 mg, 50 mg, Oral, Q6H PRN, Columba Jones MD, 50 mg at 18 2217  •  ibuprofen (ADVIL,MOTRIN) tablet 600 mg, 600 mg, Oral, Q6H PRN, Columba Jones MD, 600 mg at 18 0837  •  loperamide (IMODIUM) capsule 2 mg, 2 mg, Oral, 4x Daily PRN, Columba Jones MD  •  [COMPLETED] LORazepam (ATIVAN) tablet 2 mg, 2 mg, Oral, 3 times per day, 2 mg at 18 **FOLLOWED BY** LORazepam (ATIVAN) tablet 1.5 mg, 1.5 mg, Oral, 3 times per day, 1.5 mg at 18 0837 **FOLLOWED BY** [START ON 3/24/2018] LORazepam (ATIVAN) tablet 1 mg, 1 mg, Oral, 3 times per day **FOLLOWED BY** [START ON 3/25/2018] LORazepam (ATIVAN) tablet 0.5 mg, 0.5 mg, Oral, 3 times per day, Columba Jones MD  •  [] LORazepam (ATIVAN) tablet 2 mg, 2 mg, Oral, Q4H PRN **FOLLOWED BY** LORazepam (ATIVAN) tablet 1.5 mg, 1.5 mg, Oral, Q4H PRN **FOLLOWED BY** [START ON 3/24/2018] LORazepam (ATIVAN) tablet 1 mg, 1 mg, Oral, Q4H PRN **FOLLOWED BY** [START ON 3/25/2018] LORazepam (ATIVAN) tablet 0.5 mg, 0.5 mg, Oral, Q4H PRN, Columba Jones MD  •  magnesium hydroxide (MILK OF MAGNESIA) suspension 2400 mg/10mL 10 mL, 10 mL, Oral, Daily PRN, Columba Jones MD  •  multivitamin (DAILY BENITO) tablet 1 tablet, 1 tablet, Oral, Daily, Columba Jones MD, 1 tablet at 18 0835  •  nicotine (NICODERM CQ) 21 MG/24HR patch 1 patch, 1 patch,  Transdermal, Q24H, Columba Jones MD  •  OLANZapine (zyPREXA) tablet 2.5 mg, 2.5 mg, Oral, BID PRN, Thierno Owens MD  •  ondansetron (ZOFRAN) tablet 4 mg, 4 mg, Oral, Q6H PRN, Columba Jones MD  •  polyethylene glycol 3350 powder (packet), 17 g, Oral, Daily PRN, Thierno Owens MD  •  rOPINIRole (REQUIP) tablet 0.5 mg, 0.5 mg, Oral, Q8H, Thierno Owens MD  •  sodium chloride (OCEAN) nasal spray 2 spray, 2 spray, Each Nare, PRN, Columba Jones MD  •  traZODone (DESYREL) tablet 50 mg, 50 mg, Oral, Nightly, Thierno Owens MD, 50 mg at 03/22/18 2119  •  vitamin B-1 tablet 100 mg, 100 mg, Oral, Daily, Columba Jones MD, 100 mg at 03/23/18 0835    ASSESSMENT & PLAN:    Active Problems:    Benzodiazepine withdrawal with perceptual disturbance  Plan: Continue Ativan detox.  I've added olanzapine 2.5 mg twice a day as needed for severe anxiety and perceptual disturbances.      Opioid dependence with withdrawal  Plan: I have extended the Subutex detox to 2 mg later this afternoon and 2 mg tomorrow and then stop.  Increase Requip 20.5 mg 3 times a day and continue to treat supportively      Alcohol dependence with withdrawal  Plan: Continue Ativan detox        Behavioral Health Treatment Plan and Problem List: I have reviewed and approved the Behavioral Health Treatment Plan and Problem list.  The patient has had a chance to review and agrees with the treatment plan.     Clinician:  Thierno Owens MD  03/23/18  9:50 AM

## 2018-03-23 NOTE — PROGRESS NOTES
Navigator is assisting primary therapist with discharge planning. Contacted [facility] who stated that they will not have a bed until possibly Tuesday. Asked that I call back on Monday for specific admission date.

## 2018-03-23 NOTE — PLAN OF CARE
Problem: Patient Care Overview  Goal: Plan of Care Review  Outcome: Ongoing (interventions implemented as appropriate)   03/23/18 050   Coping/Psychosocial   Plan of Care Reviewed With patient   Coping/Psychosocial   Patient Agreement with Plan of Care agrees   Plan of Care Review   Progress improving   OTHER   Outcome Summary Pt. is stable and slept all night. Reported anxiety/depression 7/7. Craving 6. Moderate wd symptoms. Denies hallucinations. Pleasant and interacted in dayroom with staff and peers and watched tv. Did attend Group.

## 2018-03-23 NOTE — PLAN OF CARE
Problem: Patient Care Overview  Goal: Interprofessional Rounds/Family Conf  Outcome: Ongoing (interventions implemented as appropriate)   03/23/18 9002   Interdisciplinary Rounds/Family Conf   Summary Discussed patient with nursing staff as patient was asleep in bed this afternoon, not feeling well.   Interdisciplinary Rounds/Family Conf   Participants social work;nursing     Nursing staff reported that patient became irritable and demanding today related to smoke breaks.  Patient has been complaining of cravings and withdrawals today.  The navigator contacted [facility] today and was informed that there may be a bed on Tuesday for patient and that a call would need to be made to them on Monday to discuss bed availability.  Patient has reported that he will attend residential following his stabilization.

## 2018-03-24 PROCEDURE — 99232 SBSQ HOSP IP/OBS MODERATE 35: CPT | Performed by: PSYCHIATRY & NEUROLOGY

## 2018-03-24 RX ORDER — CHLORDIAZEPOXIDE HYDROCHLORIDE 25 MG/1
25 CAPSULE, GELATIN COATED ORAL ONCE
Status: COMPLETED | OUTPATIENT
Start: 2018-03-24 | End: 2018-03-24

## 2018-03-24 RX ORDER — CHLORDIAZEPOXIDE HYDROCHLORIDE 25 MG/1
25 CAPSULE, GELATIN COATED ORAL EVERY 8 HOURS SCHEDULED
Status: DISCONTINUED | OUTPATIENT
Start: 2018-03-24 | End: 2018-03-24

## 2018-03-24 RX ORDER — CHLORDIAZEPOXIDE HYDROCHLORIDE 25 MG/1
25 CAPSULE, GELATIN COATED ORAL EVERY 8 HOURS SCHEDULED
Status: COMPLETED | OUTPATIENT
Start: 2018-03-24 | End: 2018-03-24

## 2018-03-24 RX ADMIN — BUSPIRONE HYDROCHLORIDE 15 MG: 10 TABLET ORAL at 08:41

## 2018-03-24 RX ADMIN — CHLORDIAZEPOXIDE HYDROCHLORIDE 25 MG: 25 CAPSULE ORAL at 08:43

## 2018-03-24 RX ADMIN — LORAZEPAM 1 MG: 1 TABLET ORAL at 21:19

## 2018-03-24 RX ADMIN — ROPINIROLE 0.5 MG: 0.25 TABLET ORAL at 13:30

## 2018-03-24 RX ADMIN — LORAZEPAM 1 MG: 1 TABLET ORAL at 14:03

## 2018-03-24 RX ADMIN — ROPINIROLE 0.5 MG: 0.25 TABLET ORAL at 05:50

## 2018-03-24 RX ADMIN — BUSPIRONE HYDROCHLORIDE 15 MG: 10 TABLET ORAL at 20:56

## 2018-03-24 RX ADMIN — CHLORDIAZEPOXIDE HYDROCHLORIDE 25 MG: 25 CAPSULE ORAL at 22:10

## 2018-03-24 RX ADMIN — BUPRENORPHINE HCL 2 MG: 2 TABLET SUBLINGUAL at 08:42

## 2018-03-24 RX ADMIN — Medication 1 TABLET: at 08:41

## 2018-03-24 RX ADMIN — CHLORDIAZEPOXIDE HYDROCHLORIDE 25 MG: 25 CAPSULE ORAL at 13:33

## 2018-03-24 RX ADMIN — CYCLOBENZAPRINE HYDROCHLORIDE 10 MG: 10 TABLET, FILM COATED ORAL at 22:13

## 2018-03-24 RX ADMIN — HYDROXYZINE HYDROCHLORIDE 50 MG: 50 TABLET ORAL at 13:34

## 2018-03-24 RX ADMIN — TRAZODONE HYDROCHLORIDE 50 MG: 50 TABLET ORAL at 22:12

## 2018-03-24 RX ADMIN — LORAZEPAM 1 MG: 1 TABLET ORAL at 08:43

## 2018-03-24 RX ADMIN — CYCLOBENZAPRINE HYDROCHLORIDE 10 MG: 10 TABLET, FILM COATED ORAL at 13:34

## 2018-03-24 RX ADMIN — ROPINIROLE 0.5 MG: 0.25 TABLET ORAL at 21:18

## 2018-03-24 RX ADMIN — THIAMINE HCL (VITAMIN B1) 50 MG TABLET 100 MG: at 08:41

## 2018-03-24 NOTE — PLAN OF CARE
Problem: Patient Care Overview  Goal: Plan of Care Review   03/24/18 1737   Coping/Psychosocial   Plan of Care Reviewed With patient   Coping/Psychosocial   Patient Agreement with Plan of Care agrees   Plan of Care Review   Progress improving

## 2018-03-24 NOTE — PLAN OF CARE
Problem: Overarching Goals (Adult)  Goal: Optimized Coping Skills in Response to Life Stressors  Outcome: Ongoing (interventions implemented as appropriate)   03/24/18 1736   Overarching Goals (Adult)   Optimized Coping Skills in Response to Life Stressors making progress toward outcome      03/24/18 1736   Overarching Goals (Adult)   Optimized Coping Skills in Response to Life Stressors making progress toward outcome     Goal: Develops/Participates in Therapeutic [place] to Support Successful Transition  Outcome: Ongoing (interventions implemented as appropriate)   03/24/18 1736   Overarching Goals (Adult)   Develops/Participates in Therapeutic [place] to Support Successful Transition making progress toward outcome

## 2018-03-24 NOTE — PLAN OF CARE
Problem: Patient Care Overview  Goal: Plan of Care Review  Outcome: Ongoing (interventions implemented as appropriate)   03/24/18 0558   Coping/Psychosocial   Plan of Care Reviewed With patient   Coping/Psychosocial   Patient Agreement with Plan of Care agrees   Plan of Care Review   Progress improving   OTHER   Outcome Summary Pt cooperative but tearful and increased anxiety for pm nursing assessment on 3/23/18. Pt tapping feet , restless ,tachycardic and has increased blood pressure.. Anxiety 8/10, depression 8/10, cravings 5/10. Pt led group session . 3/24/18 0601       Problem: Overarching Goals (Adult)  Goal: Adheres to Safety Considerations for Self and Others  Outcome: Ongoing (interventions implemented as appropriate)    Goal: Optimized Coping Skills in Response to Life Stressors  Outcome: Ongoing (interventions implemented as appropriate)    Goal: Develops/Participates in Therapeutic [place] to Support Successful Transition  Outcome: Ongoing (interventions implemented as appropriate)

## 2018-03-24 NOTE — PROGRESS NOTES
"    Detox Recovery Unit Progress Note   Clinician: Pardeep Lowe MD  Admission Date: 3/21/2018  8:13 AM 03/24/18    Behavioral Health Treatment Plan and Problem List: I have reviewed and approved the Behavioral Health Treatment Plan and Problem list.    Allergies  No Known Allergies    Hospital Day: 3 days      Assessment completed within view of staff    History  CC: Detox Recovery Unit followup note  Interval HPI: Patient seen and evaluated by me.  Chart reviewed. Patient is withdrawing from opiates, xanax.  Current withdrawal symptoms include: cold chills, night sweats, restless legs, nausea, anxiety, tremors. +VH - \"bugs on the floor\"    ROS otherwise as below.      Severity of Withdrawal Symptoms: 9/10  Severity of Cravings: 8/10      Interval Review of Systems:   General ROS: negative for - fever.  Positive for withdrawal symptoms mentioned above.  Endocrine ROS: negative for - palpitations  Respiratory ROS: no cough, shortness of breath, or wheezing  Cardiovascular ROS: no chest pain or dyspnea on exertion  Gastrointestinal ROS: no abdominal pain,no black or bloody stools    /62 (BP Location: Left arm, Patient Position: Lying)   Pulse 70   Temp 97.7 °F (36.5 °C) (Temporal Artery )   Resp 16   Ht 182.9 cm (72\")   Wt 66.5 kg (146 lb 9.7 oz)   SpO2 98%   BMI 19.88 kg/m²     Mental Status Exam  Mood: anxious  Affect: dysphoric   Thought Processes: linear, logical, and goal directed  Thought Content:  sensorium intact and +perceptual disturbances  Oriented X3:  yes  Suicidal Thoughts: none        Medical Decision Making:   Labs:     Lab Results (last 24 hours)     ** No results found for the last 24 hours. **            Radiology:     Imaging Results (last 24 hours)     ** No results found for the last 24 hours. **            EKG:     ECG/EMG Results (most recent)     Procedure Component Value Units Date/Time    ECG 12 Lead [[id number]] Collected:  03/22/18 0208     Updated:  03/22/18 1442    " Narrative:       Test Reason : Potential adverse reaction to medications.  Blood Pressure : **/** mmHG  Vent. Rate : 051 BPM     Atrial Rate : 051 BPM     P-R Int : 116 ms          QRS Dur : 122 ms      QT Int : 458 ms       P-R-T Axes : 046 015 041 degrees     QTc Int : 422 ms    Sinus bradycardia  Nonspecific intraventricular conduction delay  Borderline ECG  No previous ECGs available  nonspecific ST elevation in the precordial leads, clinical correlation  required.  Confirmed by Jerry Cobb ([id number]) on 3/22/2018 2:41:59 PM    Referred By:  MARGY           Confirmed By:Jerry Cobb           Medications:     buprenorphine 2 mg Sublingual Daily   busPIRone 15 mg Oral BID   LORazepam 1 mg Oral 3 times per day   Followed by      [START ON 3/25/2018] LORazepam 0.5 mg Oral 3 times per day   multivitamin 1 tablet Oral Daily   nicotine 1 patch Transdermal Q24H   rOPINIRole 0.5 mg Oral Q8H   traZODone 50 mg Oral Nightly   vitamin B-1 100 mg Oral Daily          All medications reviewed.      Assessment and Plan:      Active Problems:    Benzodiazepine withdrawal with perceptual disturbance  Plan: Continue Ativan detox. Continue olanzapine 2.5 mg twice a day as needed for severe anxiety and perceptual disturbances vs. VH.  Will add Librium 25mg TID today due to the severity of his withdrawal symptoms.       Opioid dependence with withdrawal  Plan: He will receive one more dose of Suboxone this morning.  continue Requip 0.5 mg 3 times a day and continue to treat supportively       Alcohol dependence with withdrawal  Plan: Continue Ativan detox          Continue hospitalization for medical management of drug withdrawal and patient safety and stabilization.  Continue individual and group chemical dependency counseling.   Therapist and treatment team will continue to assist patient in establishing plans for follow-up and rehabilitation that will serve as the next step in care once patient has completed the medical  detox.

## 2018-03-25 PROCEDURE — 99232 SBSQ HOSP IP/OBS MODERATE 35: CPT | Performed by: PSYCHIATRY & NEUROLOGY

## 2018-03-25 RX ORDER — CHLORDIAZEPOXIDE HYDROCHLORIDE 10 MG/1
10 CAPSULE, GELATIN COATED ORAL ONCE
Status: COMPLETED | OUTPATIENT
Start: 2018-03-25 | End: 2018-03-25

## 2018-03-25 RX ORDER — CHLORDIAZEPOXIDE HYDROCHLORIDE 10 MG/1
10 CAPSULE, GELATIN COATED ORAL EVERY 8 HOURS SCHEDULED
Status: COMPLETED | OUTPATIENT
Start: 2018-03-25 | End: 2018-03-25

## 2018-03-25 RX ADMIN — CHLORDIAZEPOXIDE HYDROCHLORIDE 10 MG: 10 CAPSULE ORAL at 09:36

## 2018-03-25 RX ADMIN — LORAZEPAM 0.5 MG: 0.5 TABLET ORAL at 21:03

## 2018-03-25 RX ADMIN — CHLORDIAZEPOXIDE HYDROCHLORIDE 10 MG: 10 CAPSULE ORAL at 21:03

## 2018-03-25 RX ADMIN — ROPINIROLE 0.5 MG: 0.25 TABLET ORAL at 06:00

## 2018-03-25 RX ADMIN — ONDANSETRON 4 MG: 4 TABLET, FILM COATED ORAL at 21:04

## 2018-03-25 RX ADMIN — BUSPIRONE HYDROCHLORIDE 15 MG: 10 TABLET ORAL at 21:03

## 2018-03-25 RX ADMIN — DICYCLOMINE HYDROCHLORIDE 10 MG: 10 CAPSULE ORAL at 14:37

## 2018-03-25 RX ADMIN — THIAMINE HCL (VITAMIN B1) 50 MG TABLET 100 MG: at 08:53

## 2018-03-25 RX ADMIN — ROPINIROLE 0.5 MG: 0.25 TABLET ORAL at 21:03

## 2018-03-25 RX ADMIN — HYDROXYZINE HYDROCHLORIDE 50 MG: 50 TABLET ORAL at 08:54

## 2018-03-25 RX ADMIN — LORAZEPAM 0.5 MG: 0.5 TABLET ORAL at 14:29

## 2018-03-25 RX ADMIN — CHLORDIAZEPOXIDE HYDROCHLORIDE 10 MG: 10 CAPSULE ORAL at 14:28

## 2018-03-25 RX ADMIN — TRAZODONE HYDROCHLORIDE 50 MG: 50 TABLET ORAL at 21:03

## 2018-03-25 RX ADMIN — CYCLOBENZAPRINE HYDROCHLORIDE 10 MG: 10 TABLET, FILM COATED ORAL at 17:25

## 2018-03-25 RX ADMIN — LORAZEPAM 0.5 MG: 0.5 TABLET ORAL at 08:54

## 2018-03-25 RX ADMIN — LOPERAMIDE HYDROCHLORIDE 2 MG: 2 CAPSULE ORAL at 17:25

## 2018-03-25 RX ADMIN — ROPINIROLE 0.5 MG: 0.25 TABLET ORAL at 14:29

## 2018-03-25 RX ADMIN — HYDROXYZINE HYDROCHLORIDE 50 MG: 50 TABLET ORAL at 21:04

## 2018-03-25 RX ADMIN — IBUPROFEN 600 MG: 600 TABLET ORAL at 21:04

## 2018-03-25 RX ADMIN — CYCLOBENZAPRINE HYDROCHLORIDE 10 MG: 10 TABLET, FILM COATED ORAL at 09:37

## 2018-03-25 RX ADMIN — OLANZAPINE 2.5 MG: 2.5 TABLET, FILM COATED ORAL at 17:22

## 2018-03-25 RX ADMIN — Medication 1 TABLET: at 08:53

## 2018-03-25 RX ADMIN — BUSPIRONE HYDROCHLORIDE 15 MG: 10 TABLET ORAL at 08:53

## 2018-03-25 NOTE — PLAN OF CARE
Problem: Patient Care Overview  Goal: Plan of Care Review  Outcome: Ongoing (interventions implemented as appropriate)   03/25/18 1512   Coping/Psychosocial   Plan of Care Reviewed With patient   Coping/Psychosocial   Patient Agreement with Plan of Care agrees   Plan of Care Review   Progress improving       Problem: Overarching Goals (Adult)  Goal: Adheres to Safety Considerations for Self and Others  Outcome: Ongoing (interventions implemented as appropriate)   03/25/18 1512   Overarching Goals (Adult)   Adheres to Safety Considerations for Self and Others making progress toward outcome     Goal: Optimized Coping Skills in Response to Life Stressors  Outcome: Ongoing (interventions implemented as appropriate)   03/25/18 1512   Overarching Goals (Adult)   Optimized Coping Skills in Response to Life Stressors making progress toward outcome

## 2018-03-25 NOTE — PROGRESS NOTES
"    Detox Recovery Unit Progress Note   Clinician: Pardeep Lowe MD  Admission Date: 3/21/2018  9:24 AM 03/25/18    Behavioral Health Treatment Plan and Problem List: I have reviewed and approved the Behavioral Health Treatment Plan and Problem list.    Allergies  No Known Allergies    Hospital Day: 4 days      Assessment completed within view of staff    History  CC: Detox Recovery Unit followup note  Interval HPI: Patient seen and evaluated by me.  Chart reviewed. Patient is withdrawing from opiates, xanax.   Current withdrawal symptoms include: anxiety, cold chills, night sweats, restless legs, nausea, anxiety, tremors.  ROS otherwise as below.    The librium did help yesterday, but has worn off this morning.    Severity of Withdrawal Symptoms: 10/10  Severity of Cravings: 9/10      Interval Review of Systems:   General ROS: negative for - fever.  Positive for withdrawal symptoms mentioned above.  Endocrine ROS: negative for - palpitations  Respiratory ROS: no cough, shortness of breath, or wheezing  Cardiovascular ROS: no chest pain or dyspnea on exertion  Gastrointestinal ROS: no abdominal pain,no black or bloody stools    /77 (BP Location: Left arm, Patient Position: Sitting)   Pulse 90   Temp 98.3 °F (36.8 °C) (Temporal Artery )   Resp 18   Ht 182.9 cm (72\")   Wt 66.5 kg (146 lb 9.7 oz)   SpO2 99%   BMI 19.88 kg/m²     Mental Status Exam  Mood: anxious  Affect: constricted   Thought Processes: linear, logical, and goal directed  Thought Content:  sensorium intact  Oriented X3:  yes  Suicidal Thoughts: none        Medical Decision Making:   Labs:     Lab Results (last 24 hours)     ** No results found for the last 24 hours. **            Radiology:     Imaging Results (last 24 hours)     ** No results found for the last 24 hours. **            EKG:     ECG/EMG Results (most recent)     Procedure Component Value Units Date/Time    ECG 12 Lead [[id number]] Collected:  03/22/18 0208     Updated:  " 03/22/18 1442    Narrative:       Test Reason : Potential adverse reaction to medications.  Blood Pressure : **/** mmHG  Vent. Rate : 051 BPM     Atrial Rate : 051 BPM     P-R Int : 116 ms          QRS Dur : 122 ms      QT Int : 458 ms       P-R-T Axes : 046 015 041 degrees     QTc Int : 422 ms    Sinus bradycardia  Nonspecific intraventricular conduction delay  Borderline ECG  No previous ECGs available  nonspecific ST elevation in the precordial leads, clinical correlation  required.  Confirmed by Jerry Cobb ([id number]) on 3/22/2018 2:41:59 PM    Referred By:  MARGY           Confirmed By:Jerry Cobb           Medications:     busPIRone 15 mg Oral BID   LORazepam 0.5 mg Oral 3 times per day   multivitamin 1 tablet Oral Daily   nicotine 1 patch Transdermal Q24H   rOPINIRole 0.5 mg Oral Q8H   traZODone 50 mg Oral Nightly   vitamin B-1 100 mg Oral Daily          All medications reviewed.      Assessment and Plan:  Active Problems:    Benzodiazepine withdrawal with perceptual disturbance  Plan: Continue Ativan detox. Continue olanzapine 2.5 mg twice a day as needed for severe anxiety and perceptual disturbances vs. VH.  Will add Librium 10mg TID today due to the severity of his withdrawal symptoms, as the 25mg of TID Librium helped yesterday.       Opioid dependence with withdrawal  Plan: He has completed Subutex detox protocol.  Will continue Requip 0.5 mg 3 times a day and continue to treat supportively       Alcohol dependence with withdrawal  Plan: Continue Ativan detox       Continue hospitalization for medical management of drug withdrawal and patient safety and stabilization.  Continue individual and group chemical dependency counseling.   Therapist and treatment team will continue to assist patient in establishing plans for follow-up and rehabilitation that will serve as the next step in care once patient has completed the medical detox.

## 2018-03-25 NOTE — PLAN OF CARE
Problem: Overarching Goals (Adult)  Goal: Develops/Participates in Therapeutic [place] to Support Successful Transition  Outcome: Ongoing (interventions implemented as appropriate)   03/25/18 1512   Overarching Goals (Adult)   Develops/Participates in Therapeutic [place] to Support Successful Transition making progress toward outcome

## 2018-03-26 VITALS
BODY MASS INDEX: 19.86 KG/M2 | DIASTOLIC BLOOD PRESSURE: 73 MMHG | OXYGEN SATURATION: 98 % | WEIGHT: 146.61 LBS | HEART RATE: 105 BPM | HEIGHT: 72 IN | TEMPERATURE: 98 F | SYSTOLIC BLOOD PRESSURE: 125 MMHG | RESPIRATION RATE: 18 BRPM

## 2018-03-26 LAB
HCV RNA SERPL NAA+PROBE-ACNC: 5890 IU/ML
HCV RNA SERPL NAA+PROBE-LOG IU: 3.77 LOG10 IU/ML
TEST INFORMATION: NORMAL

## 2018-03-26 PROCEDURE — 99238 HOSP IP/OBS DSCHRG MGMT 30/<: CPT | Performed by: PSYCHIATRY & NEUROLOGY

## 2018-03-26 RX ORDER — ROPINIROLE 0.5 MG/1
0.5 TABLET, FILM COATED ORAL EVERY 8 HOURS SCHEDULED
Qty: 90 TABLET | Refills: 0 | Status: SHIPPED | OUTPATIENT
Start: 2018-03-26

## 2018-03-26 RX ORDER — OLANZAPINE 5 MG/1
2.5 TABLET ORAL NIGHTLY
Qty: 30 TABLET | Refills: 0 | Status: SHIPPED | OUTPATIENT
Start: 2018-03-26

## 2018-03-26 RX ADMIN — IBUPROFEN 600 MG: 600 TABLET ORAL at 10:17

## 2018-03-26 RX ADMIN — ROPINIROLE 0.5 MG: 0.25 TABLET ORAL at 14:14

## 2018-03-26 RX ADMIN — HYDROXYZINE HYDROCHLORIDE 50 MG: 50 TABLET ORAL at 10:17

## 2018-03-26 RX ADMIN — CYCLOBENZAPRINE HYDROCHLORIDE 10 MG: 10 TABLET, FILM COATED ORAL at 10:17

## 2018-03-26 RX ADMIN — Medication 1 TABLET: at 10:13

## 2018-03-26 RX ADMIN — ROPINIROLE 0.5 MG: 0.25 TABLET ORAL at 06:39

## 2018-03-26 RX ADMIN — BUSPIRONE HYDROCHLORIDE 15 MG: 10 TABLET ORAL at 10:13

## 2018-03-26 RX ADMIN — THIAMINE HCL (VITAMIN B1) 50 MG TABLET 100 MG: at 10:14

## 2018-03-26 NOTE — PLAN OF CARE
Problem: Patient Care Overview  Goal: Plan of Care Review  Outcome: Ongoing (interventions implemented as appropriate)   03/26/18 0251   Coping/Psychosocial   Plan of Care Reviewed With patient   Coping/Psychosocial   Patient Agreement with Plan of Care agrees   Plan of Care Review   Progress no change   OTHER   Outcome Summary Pt continues to report high levels of anxiety and depression. Rates cravings 8 and c/o of multiple w/d symptoms.

## 2018-03-26 NOTE — PROGRESS NOTES
Contacted Shantelle with [facility] and she reported that as of now there have been no cancellations, she reports currently that she does not have a bed available for patient.

## 2018-03-26 NOTE — DISCHARGE SUMMARY
"      PSYCHIATRIC DISCHARGE SUMMARY     Patient Identification:  Name:  Van Heard  Age:  28 y.o.  Sex:  male  :  1990  MRN:  [id number]  Visit Number:  [id number]      Date of Admission:3/21/2018   Date of Discharge:  3/26/2018    Discharge Diagnosis:  Active Problems:    Benzodiazepine withdrawal with perceptual disturbance    Opioid dependence with withdrawal    Alcohol dependence with withdrawal  Hepatitis C  Generalized anxiety disorder      Admission Diagnosis:  Polysubstance abuse [F19.10]     Hospital Course  Patient is a 28 y.o. male presented with benzodiazepine dependence, alcohol, and opioid dependence.  The patient was started on an Ativan taper for benzodiazepine and alcohol withdrawal.  He completed this without complications.  He did have some perceptual disturbances at nighttime which were mild.  He was also treated with Subutex and supportive medications for opioid withdrawal.  By the end of the hospitalization, he was still in mild opioid withdrawal however he felt he could manage the symptoms on his own.  The patient initially thought that he had a bed at [facility].  However, on the day of discharge she did not have a bed available.  His father was in the area and the patient requested to discharge to his father's care and to continue working on finding a long-term rehabilitation.  He plans on continuing his efforts to get in to [facility] and was also given other resources for area rehabs.  He was also expected to follow-up with [facility] in [place].  Also while in the hospital, he was continued on medications for anxiety.  He has been on multiple medications and Zyprexa 2.5 mg nightly was added to his regimen which he found helpful.  He was also sent home on Requip 0.5 mg 3 times a day for ongoing opioid withdrawal symptoms.    Mental Status Exam upon discharge:   Mood \"alright\"   Affect-congruent, appropriate, stable  Thought Content-goal directed, no " delusional material present  Thought process-linear, organized.  Suicidality: No SI  Homicidality: No HI  Perception: No AH/VH    Procedures Performed         Consults:   Consults     No orders found from 2/20/2018 to 3/22/2018.          Pertinent Test Results:   Results for ANGÉLICA FIELD (MRN [id number]) as of 3/26/2018 13:38   Ref. Range 3/21/2018 17:12 3/21/2018 17:13 3/22/2018 02:08 3/22/2018 04:32 3/22/2018 16:24   Glucose Latest Ref Range: 70 - 110 mg/dL 92       Sodium Latest Ref Range: 135 - 153 mmol/L 140       Potassium Latest Ref Range: 3.5 - 5.3 mmol/L 3.7       CO2 Latest Ref Range: 24.3 - 31.9 mmol/L 31.2       Chloride Latest Ref Range: 99 - 112 mmol/L 103       Anion Gap Latest Ref Range: 3.6 - 11.2 mmol/L 5.8       Creatinine Latest Ref Range: 0.43 - 1.29 mg/dL 1.08       BUN Latest Ref Range: 7 - 21 mg/dL 11       BUN/Creatinine Ratio Latest Ref Range: 7.0 - 25.0  10.2       Calcium Latest Ref Range: 7.7 - 10.0 mg/dL 9.4       eGFR Non African Amer Latest Ref Range: >60 mL/min/1.73 80       Alkaline Phosphatase Latest Ref Range: 40 - 129 U/L 55       Total Protein Latest Ref Range: 6.0 - 8.0 g/dL 7.2       ALT (SGPT) Latest Ref Range: 10 - 44 U/L 174 (H)       AST (SGOT) Latest Ref Range: 10 - 34 U/L 69 (H)       Total Bilirubin Latest Ref Range: 0.2 - 1.8 mg/dL 0.4       Albumin Latest Ref Range: 3.50 - 5.00 g/dL 4.20       Globulin Latest Units: gm/dL 3.0       A/G Ratio Latest Ref Range: 1.5 - 2.5 g/dL 1.4 (L)       Magnesium Latest Ref Range: 1.7 - 2.6 mg/dL 2.2       Osmolality Calc Latest Ref Range: 273.0 - 305.0 mOsm/kg 278.4       WBC Latest Ref Range: 4.50 - 12.50 10*3/mm3 4.84       RBC Latest Ref Range: 4.70 - 6.10 10*6/mm3 4.86       Hemoglobin Latest Ref Range: 14.0 - 18.0 g/dL 14.8       Hematocrit Latest Ref Range: 42.0 - 52.0 % 43.2       RDW Latest Ref Range: 11.5 - 14.5 % 12.3       MCV Latest Ref Range: 80.0 - 94.0 fL 88.9       MCH Latest Ref Range: 27.0 - 33.0 pg 30.5       MCHC  Latest Ref Range: 33.0 - 37.0 g/dL 34.3       MPV Latest Ref Range: 6.0 - 10.0 fL 10.5 (H)       Platelets Latest Ref Range: 130 - 400 10*3/mm3 190       RDW-SD Latest Ref Range: 37.0 - 54.0 fl 39.0       Neutrophil % Latest Ref Range: 30.0 - 70.0 % 43.0       Lymphocyte % Latest Ref Range: 21.0 - 51.0 % 44.0       Monocyte % Latest Ref Range: 0.0 - 10.0 % 9.0       Eosinophil % Latest Ref Range: 0.0 - 5.0 % 4.0       Neutrophils, Absolute Latest Ref Range: 1.40 - 6.50 10*3/mm3 2.08       Lymphocytes, Absolute Latest Ref Range: 1.00 - 3.00 10*3/mm3 2.13       Monocytes, Absolute Latest Ref Range: 0.10 - 0.90 10*3/mm3 0.44       Eosinophils, Absolute Latest Ref Range: 0.00 - 0.70 10*3/mm3 0.19       RBC Morphology Latest Ref Range: Normal  Normal       Platelet Morphology Latest Ref Range: Normal  Normal       Hep A IgM Latest Ref Range: Non-Reactive     Non-Reactive    Hepatitis B Surface Ag Latest Ref Range: Non-Reactive     Non-Reactive    Hep B C IgM Latest Ref Range: Non-Reactive     Non-Reactive    Hepatitis C Ab Latest Ref Range: Non-Reactive     Reactive (A)    HCV log10 Latest Units: log10 IU/mL     3.770   HIV-1/ HIV-2 Latest Ref Range: Non-Reactive     Non-Reactive    Color, UA Latest Ref Range: Yellow, Straw   Yellow      Appearance, UA Latest Ref Range: Clear   Clear      Specific Roberts, UA Latest Ref Range: 1.005 - 1.030   1.011      pH, UA Latest Ref Range: 5.0 - 8.0   7.0      Glucose, UA Latest Ref Range: Negative   Negative      Ketones, UA Latest Ref Range: Negative   Negative      Blood, UA Latest Ref Range: Negative   Negative      Nitrite, UA Latest Ref Range: Negative   Negative      Leuk Esterase, UA Latest Ref Range: Negative   Negative      Protein, UA Latest Ref Range: Negative   Negative      Bilirubin, UA Latest Ref Range: Negative   Negative      Urobilinogen, UA Latest Ref Range: 0.2 - 1.0 E.U./dL   1.0 E.U./dL      Ethanol % Latest Units: % <0.010       Ethanol Latest Ref Range: <=10  mg/dL <10       6-ACETYL MORPHINE Latest Ref Range: Negative   Negative      Amphetamine Screen, Urine Latest Ref Range: Negative   Positive (A)      Barbiturates Screen, Urine Latest Ref Range: Negative   Negative      Benzodiazepine Screen, Urine Latest Ref Range: Negative   Positive (A)      Buprenorphine, Screen, Urine Latest Ref Range: Negative   Positive (A)      Cocaine Screen, Urine Latest Ref Range: Negative   Negative      Methadone Screen , Urine Latest Ref Range: Negative   Negative      Opiate Screen, Urine Latest Ref Range: Negative   Positive (A)      Oxycodone Screen, Urine Latest Ref Range: Negative   Negative      Phencyclidine (PCP), Urine Latest Ref Range: Negative   Negative      THC Screen, Urine Latest Ref Range: Negative   Negative      Test Information Unknown     Comment   Hepatitis C Quantitation Latest Units: IU/mL     5890   ECG 12-LEAD Unknown   Attch       Condition on Discharge:  stable    Vital Signs  Temp:  [97.8 °F (36.6 °C)-98.2 °F (36.8 °C)] 97.8 °F (36.6 °C)  Heart Rate:  [77-89] 85  Resp:  [16-18] 16  BP: (100-130)/(54-78) 100/54      Discharge Disposition:  Home or Self Care    Discharge Medications:   Van Heard   Home Medication Instructions RK:[id number]    Printed on:03/26/18 1478   Medication Information                      busPIRone (BUSPAR) 15 MG tablet  Take 15 mg by mouth 2 (Two) Times a Day.             OLANZapine (zyPREXA) 5 MG tablet  Take 0.5 tablets by mouth Every Night.             rOPINIRole (REQUIP) 0.5 MG tablet  Take 1 tablet by mouth Every 8 (Eight) Hours. Take 1 hour before bedtime.             traZODone (DESYREL) 50 MG tablet  Take 50 mg by mouth Every Night.                 Discharge Diet: regular     Activity at Discharge: as tolerated    Follow-up Appointments  [facility]    Test Results Pending at Discharge      Clinician:   Thierno Owens MD  03/26/18  1:37 PM

## 2018-03-26 NOTE — PLAN OF CARE
Problem: Patient Care Overview  Goal: Discharge Needs Assessment  Outcome: Ongoing (interventions implemented as appropriate)   03/22/18 1510 03/26/18 1136   Discharge Needs Assessment   Readmission Within the Last 30 Days --  no previous admission in last 30 days   Concerns to be Addressed substance/tobacco abuse/use --    Patient/Family Anticipates Transition to --  home with family   Patient/Family Anticipated Services at Transition --  mental health services;outpatient care   Transportation Concerns --  car, none   Transportation Anticipated --  family or friend will provide   Patient's Choice of Community Agency(s) --  [email or web address] while awaiting [facility]   Current Discharge Risk substance use/abuse --    Discharge Coordination/Progress --  Patient has insurance for medication and patient will have transportation home.   Discharge Needs Assessment,    Outpatient/Agency/Support Group Needs --  12 step program (specify);outpatient medication management;outpatient counseling   Anticipated Discharge Disposition --  home or self-care     DATA: Met with patient and discussed that currently [facility] does not have an available bed.  Patient reported that he would contact his father to stay with him while patient is awaiting a bed at [facility].  Patient reported that he would attend [facility] while awaiting the bed.  Patient reported that his father travels and sells insurance.  He reports that his father is also a  and is a safe place for patient.  Patient reports that his father has an RV and patient can stay with him.  Patient reports he is feeling really awful today and says his withdrawals are ongoing and continue to be very bad.  He reports that he will just discharge today as he has finished the protocol medication for detox and will await his bed with [facility] while staying with his father.    ASSESSMENT:  Patient reports ongoing withdrawal symptoms.  He reports anxiety and depression.  He  reports cravings as well.  Patient reports he will be in a safe environment if he stays with his father upon discharge.    PLAN:  Patient plans to stay with his father in his father's RV upon discharge.  Patient consented to [email or web address] awaiting his bed with [facility].  Patients father will transport patient home.

## 2024-01-21 ENCOUNTER — HOSPITAL ENCOUNTER (EMERGENCY)
Facility: HOSPITAL | Age: 34
Discharge: PSYCHIATRIC OR SUBSTANCE USE FACILITY | DRG: 885 | End: 2024-01-21
Attending: EMERGENCY MEDICINE | Admitting: EMERGENCY MEDICINE
Payer: COMMERCIAL

## 2024-01-21 ENCOUNTER — HOSPITAL ENCOUNTER (EMERGENCY)
Facility: HOSPITAL | Age: 34
Discharge: HOME | DRG: 885 | End: 2024-01-21
Attending: EMERGENCY MEDICINE | Admitting: EMERGENCY MEDICINE
Payer: COMMERCIAL

## 2024-01-21 ENCOUNTER — HOSPITAL ENCOUNTER (INPATIENT)
Facility: HOSPITAL | Age: 34
LOS: 4 days | Discharge: NURSING, REHAB OR LONG-TERM CARE | DRG: 885 | End: 2024-01-25
Attending: PSYCHIATRY & NEUROLOGY | Admitting: PSYCHIATRY & NEUROLOGY
Payer: COMMERCIAL

## 2024-01-21 VITALS
WEIGHT: 155 LBS | BODY MASS INDEX: 21.7 KG/M2 | HEIGHT: 71 IN | DIASTOLIC BLOOD PRESSURE: 90 MMHG | TEMPERATURE: 98.3 F | RESPIRATION RATE: 20 BRPM | OXYGEN SATURATION: 100 % | HEART RATE: 105 BPM | SYSTOLIC BLOOD PRESSURE: 147 MMHG

## 2024-01-21 VITALS
WEIGHT: 150 LBS | OXYGEN SATURATION: 97 % | TEMPERATURE: 97.5 F | HEIGHT: 72 IN | DIASTOLIC BLOOD PRESSURE: 87 MMHG | SYSTOLIC BLOOD PRESSURE: 137 MMHG | HEART RATE: 112 BPM | RESPIRATION RATE: 18 BRPM | BODY MASS INDEX: 20.32 KG/M2

## 2024-01-21 DIAGNOSIS — F15.10 METHAMPHETAMINE USE: ICD-10-CM

## 2024-01-21 DIAGNOSIS — F11.10 OPIOID ABUSE: ICD-10-CM

## 2024-01-21 DIAGNOSIS — F15.10 METHAMPHETAMINE USE: Primary | ICD-10-CM

## 2024-01-21 DIAGNOSIS — R45.851 SUICIDAL IDEATION: Primary | ICD-10-CM

## 2024-01-21 LAB
ALBUMIN SERPL-MCNC: 4.5 G/DL (ref 3.5–5.2)
ALBUMIN SERPL-MCNC: 4.5 G/DL (ref 3.5–5.2)
ALBUMIN/GLOB SERPL: 1.4 G/DL
ALBUMIN/GLOB SERPL: 1.6 G/DL
ALP SERPL-CCNC: 54 U/L (ref 39–117)
ALP SERPL-CCNC: 57 U/L (ref 39–117)
ALT SERPL W P-5'-P-CCNC: 14 U/L (ref 1–41)
ALT SERPL W P-5'-P-CCNC: 16 U/L (ref 1–41)
AMPHET+METHAMPHET UR QL: POSITIVE
AMPHET+METHAMPHET UR QL: POSITIVE
AMPHETAMINES UR QL: POSITIVE
AMPHETAMINES UR QL: POSITIVE
ANION GAP SERPL CALCULATED.3IONS-SCNC: 10.7 MMOL/L (ref 5–15)
ANION GAP SERPL CALCULATED.3IONS-SCNC: 13.3 MMOL/L (ref 5–15)
AST SERPL-CCNC: 26 U/L (ref 1–40)
AST SERPL-CCNC: 27 U/L (ref 1–40)
BACTERIA UR QL AUTO: ABNORMAL /HPF
BACTERIA UR QL AUTO: NORMAL /HPF
BARBITURATES UR QL SCN: NEGATIVE
BARBITURATES UR QL SCN: NEGATIVE
BASOPHILS # BLD AUTO: 0.01 10*3/MM3 (ref 0–0.2)
BASOPHILS # BLD AUTO: 0.03 10*3/MM3 (ref 0–0.2)
BASOPHILS NFR BLD AUTO: 0.1 % (ref 0–1.5)
BASOPHILS NFR BLD AUTO: 0.4 % (ref 0–1.5)
BENZODIAZ UR QL SCN: POSITIVE
BENZODIAZ UR QL SCN: POSITIVE
BILIRUB SERPL-MCNC: 0.6 MG/DL (ref 0–1.2)
BILIRUB SERPL-MCNC: 0.9 MG/DL (ref 0–1.2)
BILIRUB UR QL STRIP: NEGATIVE
BILIRUB UR QL STRIP: NEGATIVE
BUN SERPL-MCNC: 10 MG/DL (ref 6–20)
BUN SERPL-MCNC: 9 MG/DL (ref 6–20)
BUN/CREAT SERPL: 6.7 (ref 7–25)
BUN/CREAT SERPL: 6.8 (ref 7–25)
BUPRENORPHINE SERPL-MCNC: POSITIVE NG/ML
BUPRENORPHINE SERPL-MCNC: POSITIVE NG/ML
CALCIUM SPEC-SCNC: 9.4 MG/DL (ref 8.6–10.5)
CALCIUM SPEC-SCNC: 9.8 MG/DL (ref 8.6–10.5)
CANNABINOIDS SERPL QL: NEGATIVE
CANNABINOIDS SERPL QL: NEGATIVE
CHLORIDE SERPL-SCNC: 100 MMOL/L (ref 98–107)
CHLORIDE SERPL-SCNC: 99 MMOL/L (ref 98–107)
CLARITY UR: CLEAR
CLARITY UR: CLEAR
CO2 SERPL-SCNC: 22.3 MMOL/L (ref 22–29)
CO2 SERPL-SCNC: 22.7 MMOL/L (ref 22–29)
COCAINE UR QL: NEGATIVE
COCAINE UR QL: NEGATIVE
COLOR UR: ABNORMAL
COLOR UR: YELLOW
CREAT SERPL-MCNC: 1.34 MG/DL (ref 0.76–1.27)
CREAT SERPL-MCNC: 1.47 MG/DL (ref 0.76–1.27)
DEPRECATED RDW RBC AUTO: 41.5 FL (ref 37–54)
DEPRECATED RDW RBC AUTO: 41.9 FL (ref 37–54)
EGFRCR SERPLBLD CKD-EPI 2021: 63 ML/MIN/1.73
EGFRCR SERPLBLD CKD-EPI 2021: 70.4 ML/MIN/1.73
EOSINOPHIL # BLD AUTO: 0 10*3/MM3 (ref 0–0.4)
EOSINOPHIL # BLD AUTO: 0.02 10*3/MM3 (ref 0–0.4)
EOSINOPHIL NFR BLD AUTO: 0 % (ref 0.3–6.2)
EOSINOPHIL NFR BLD AUTO: 0.2 % (ref 0.3–6.2)
ERYTHROCYTE [DISTWIDTH] IN BLOOD BY AUTOMATED COUNT: 12.4 % (ref 12.3–15.4)
ERYTHROCYTE [DISTWIDTH] IN BLOOD BY AUTOMATED COUNT: 12.5 % (ref 12.3–15.4)
ETHANOL BLD-MCNC: <10 MG/DL (ref 0–10)
ETHANOL BLD-MCNC: <10 MG/DL (ref 0–10)
ETHANOL UR QL: <0.01 %
ETHANOL UR QL: <0.01 %
FENTANYL UR-MCNC: POSITIVE NG/ML
FENTANYL UR-MCNC: POSITIVE NG/ML
GEN 5 2HR TROPONIN T REFLEX: 7 NG/L
GLOBULIN UR ELPH-MCNC: 2.8 GM/DL
GLOBULIN UR ELPH-MCNC: 3.3 GM/DL
GLUCOSE SERPL-MCNC: 106 MG/DL (ref 65–99)
GLUCOSE SERPL-MCNC: 98 MG/DL (ref 65–99)
GLUCOSE UR STRIP-MCNC: NEGATIVE MG/DL
GLUCOSE UR STRIP-MCNC: NEGATIVE MG/DL
HAV IGM SERPL QL IA: ABNORMAL
HBV CORE IGM SERPL QL IA: ABNORMAL
HBV SURFACE AG SERPL QL IA: ABNORMAL
HCT VFR BLD AUTO: 41.8 % (ref 37.5–51)
HCT VFR BLD AUTO: 45.8 % (ref 37.5–51)
HCV AB SER DONR QL: REACTIVE
HGB BLD-MCNC: 13.5 G/DL (ref 13–17.7)
HGB BLD-MCNC: 15.1 G/DL (ref 13–17.7)
HGB UR QL STRIP.AUTO: NEGATIVE
HGB UR QL STRIP.AUTO: NEGATIVE
HYALINE CASTS UR QL AUTO: ABNORMAL /LPF
HYALINE CASTS UR QL AUTO: NORMAL /LPF
IMM GRANULOCYTES # BLD AUTO: 0.03 10*3/MM3 (ref 0–0.05)
IMM GRANULOCYTES # BLD AUTO: 0.03 10*3/MM3 (ref 0–0.05)
IMM GRANULOCYTES NFR BLD AUTO: 0.3 % (ref 0–0.5)
IMM GRANULOCYTES NFR BLD AUTO: 0.4 % (ref 0–0.5)
KETONES UR QL STRIP: ABNORMAL
KETONES UR QL STRIP: ABNORMAL
LEUKOCYTE ESTERASE UR QL STRIP.AUTO: NEGATIVE
LEUKOCYTE ESTERASE UR QL STRIP.AUTO: NEGATIVE
LYMPHOCYTES # BLD AUTO: 1.08 10*3/MM3 (ref 0.7–3.1)
LYMPHOCYTES # BLD AUTO: 1.45 10*3/MM3 (ref 0.7–3.1)
LYMPHOCYTES NFR BLD AUTO: 13.1 % (ref 19.6–45.3)
LYMPHOCYTES NFR BLD AUTO: 13.2 % (ref 19.6–45.3)
MAGNESIUM SERPL-MCNC: 1.9 MG/DL (ref 1.6–2.6)
MAGNESIUM SERPL-MCNC: 2.1 MG/DL (ref 1.6–2.6)
MCH RBC QN AUTO: 29.7 PG (ref 26.6–33)
MCH RBC QN AUTO: 30 PG (ref 26.6–33)
MCHC RBC AUTO-ENTMCNC: 32.3 G/DL (ref 31.5–35.7)
MCHC RBC AUTO-ENTMCNC: 33 G/DL (ref 31.5–35.7)
MCV RBC AUTO: 91.1 FL (ref 79–97)
MCV RBC AUTO: 91.9 FL (ref 79–97)
METHADONE UR QL SCN: NEGATIVE
METHADONE UR QL SCN: NEGATIVE
MONOCYTES # BLD AUTO: 0.69 10*3/MM3 (ref 0.1–0.9)
MONOCYTES # BLD AUTO: 0.97 10*3/MM3 (ref 0.1–0.9)
MONOCYTES NFR BLD AUTO: 8.4 % (ref 5–12)
MONOCYTES NFR BLD AUTO: 8.9 % (ref 5–12)
NEUTROPHILS NFR BLD AUTO: 6.38 10*3/MM3 (ref 1.7–7)
NEUTROPHILS NFR BLD AUTO: 77.5 % (ref 42.7–76)
NEUTROPHILS NFR BLD AUTO: 77.5 % (ref 42.7–76)
NEUTROPHILS NFR BLD AUTO: 8.49 10*3/MM3 (ref 1.7–7)
NITRITE UR QL STRIP: NEGATIVE
NITRITE UR QL STRIP: NEGATIVE
NRBC BLD AUTO-RTO: 0 /100 WBC (ref 0–0.2)
NRBC BLD AUTO-RTO: 0 /100 WBC (ref 0–0.2)
OPIATES UR QL: NEGATIVE
OPIATES UR QL: NEGATIVE
OXYCODONE UR QL SCN: NEGATIVE
OXYCODONE UR QL SCN: NEGATIVE
PCP UR QL SCN: NEGATIVE
PCP UR QL SCN: NEGATIVE
PH UR STRIP.AUTO: 7.5 [PH] (ref 5–8)
PH UR STRIP.AUTO: 8.5 [PH] (ref 5–8)
PLATELET # BLD AUTO: 223 10*3/MM3 (ref 140–450)
PLATELET # BLD AUTO: 228 10*3/MM3 (ref 140–450)
PMV BLD AUTO: 10.9 FL (ref 6–12)
PMV BLD AUTO: 11.2 FL (ref 6–12)
POTASSIUM SERPL-SCNC: 3.7 MMOL/L (ref 3.5–5.2)
POTASSIUM SERPL-SCNC: 4 MMOL/L (ref 3.5–5.2)
PROT SERPL-MCNC: 7.3 G/DL (ref 6–8.5)
PROT SERPL-MCNC: 7.8 G/DL (ref 6–8.5)
PROT UR QL STRIP: ABNORMAL
PROT UR QL STRIP: ABNORMAL
RBC # BLD AUTO: 4.55 10*6/MM3 (ref 4.14–5.8)
RBC # BLD AUTO: 5.03 10*6/MM3 (ref 4.14–5.8)
RBC # UR STRIP: ABNORMAL /HPF
RBC # UR STRIP: NORMAL /HPF
REF LAB TEST METHOD: ABNORMAL
REF LAB TEST METHOD: NORMAL
SODIUM SERPL-SCNC: 133 MMOL/L (ref 136–145)
SODIUM SERPL-SCNC: 135 MMOL/L (ref 136–145)
SP GR UR STRIP: 1.02 (ref 1–1.03)
SP GR UR STRIP: 1.02 (ref 1–1.03)
SQUAMOUS #/AREA URNS HPF: ABNORMAL /HPF
SQUAMOUS #/AREA URNS HPF: NORMAL /HPF
TRICYCLICS UR QL SCN: NEGATIVE
TRICYCLICS UR QL SCN: NEGATIVE
TROPONIN T DELTA: NORMAL
TROPONIN T SERPL HS-MCNC: <6 NG/L
UROBILINOGEN UR QL STRIP: ABNORMAL
UROBILINOGEN UR QL STRIP: ABNORMAL
WBC # UR STRIP: ABNORMAL /HPF
WBC # UR STRIP: NORMAL /HPF
WBC NRBC COR # BLD AUTO: 10.95 10*3/MM3 (ref 3.4–10.8)
WBC NRBC COR # BLD AUTO: 8.23 10*3/MM3 (ref 3.4–10.8)

## 2024-01-21 PROCEDURE — 83735 ASSAY OF MAGNESIUM: CPT | Performed by: EMERGENCY MEDICINE

## 2024-01-21 PROCEDURE — 93005 ELECTROCARDIOGRAM TRACING: CPT | Performed by: PSYCHIATRY & NEUROLOGY

## 2024-01-21 PROCEDURE — 85025 COMPLETE CBC W/AUTO DIFF WBC: CPT | Performed by: EMERGENCY MEDICINE

## 2024-01-21 PROCEDURE — 80053 COMPREHEN METABOLIC PANEL: CPT | Performed by: EMERGENCY MEDICINE

## 2024-01-21 PROCEDURE — 81001 URINALYSIS AUTO W/SCOPE: CPT | Performed by: EMERGENCY MEDICINE

## 2024-01-21 PROCEDURE — 80074 ACUTE HEPATITIS PANEL: CPT | Performed by: PSYCHIATRY & NEUROLOGY

## 2024-01-21 PROCEDURE — 99285 EMERGENCY DEPT VISIT HI MDM: CPT

## 2024-01-21 PROCEDURE — 99283 EMERGENCY DEPT VISIT LOW MDM: CPT

## 2024-01-21 PROCEDURE — 36415 COLL VENOUS BLD VENIPUNCTURE: CPT

## 2024-01-21 PROCEDURE — 80307 DRUG TEST PRSMV CHEM ANLYZR: CPT | Performed by: EMERGENCY MEDICINE

## 2024-01-21 PROCEDURE — 84484 ASSAY OF TROPONIN QUANT: CPT | Performed by: PSYCHIATRY & NEUROLOGY

## 2024-01-21 PROCEDURE — 25810000003 SODIUM CHLORIDE 0.9 % SOLUTION: Performed by: EMERGENCY MEDICINE

## 2024-01-21 PROCEDURE — 82077 ASSAY SPEC XCP UR&BREATH IA: CPT | Performed by: EMERGENCY MEDICINE

## 2024-01-21 PROCEDURE — 93010 ELECTROCARDIOGRAM REPORT: CPT | Performed by: INTERNAL MEDICINE

## 2024-01-21 RX ORDER — ECHINACEA PURPUREA EXTRACT 125 MG
2 TABLET ORAL AS NEEDED
Status: DISCONTINUED | OUTPATIENT
Start: 2024-01-21 | End: 2024-01-25 | Stop reason: HOSPADM

## 2024-01-21 RX ORDER — IBUPROFEN 400 MG/1
400 TABLET ORAL EVERY 6 HOURS PRN
Status: DISCONTINUED | OUTPATIENT
Start: 2024-01-21 | End: 2024-01-25 | Stop reason: HOSPADM

## 2024-01-21 RX ORDER — CHOLECALCIFEROL (VITAMIN D3) 125 MCG
5 CAPSULE ORAL NIGHTLY PRN
COMMUNITY

## 2024-01-21 RX ORDER — CHOLECALCIFEROL (VITAMIN D3) 125 MCG
5 CAPSULE ORAL NIGHTLY PRN
Status: DISCONTINUED | OUTPATIENT
Start: 2024-01-21 | End: 2024-01-25 | Stop reason: HOSPADM

## 2024-01-21 RX ORDER — LOPERAMIDE HYDROCHLORIDE 2 MG/1
2 CAPSULE ORAL
Status: DISCONTINUED | OUTPATIENT
Start: 2024-01-21 | End: 2024-01-25 | Stop reason: HOSPADM

## 2024-01-21 RX ORDER — FAMOTIDINE 20 MG/1
20 TABLET, FILM COATED ORAL 2 TIMES DAILY PRN
Status: DISCONTINUED | OUTPATIENT
Start: 2024-01-21 | End: 2024-01-25 | Stop reason: HOSPADM

## 2024-01-21 RX ORDER — LORAZEPAM 2 MG/1
2 TABLET ORAL EVERY 4 HOURS PRN
Status: ACTIVE | OUTPATIENT
Start: 2024-01-22 | End: 2024-01-23

## 2024-01-21 RX ORDER — CLONIDINE HYDROCHLORIDE 0.1 MG/1
0.1 TABLET ORAL 2 TIMES DAILY PRN
Status: DISCONTINUED | OUTPATIENT
Start: 2024-01-24 | End: 2024-01-22

## 2024-01-21 RX ORDER — ACETAMINOPHEN 325 MG/1
650 TABLET ORAL EVERY 6 HOURS PRN
Status: DISCONTINUED | OUTPATIENT
Start: 2024-01-21 | End: 2024-01-25 | Stop reason: HOSPADM

## 2024-01-21 RX ORDER — CYCLOBENZAPRINE HCL 10 MG
10 TABLET ORAL 3 TIMES DAILY PRN
Status: DISCONTINUED | OUTPATIENT
Start: 2024-01-21 | End: 2024-01-25 | Stop reason: HOSPADM

## 2024-01-21 RX ORDER — TRAZODONE HYDROCHLORIDE 50 MG/1
50 TABLET ORAL NIGHTLY PRN
Status: DISCONTINUED | OUTPATIENT
Start: 2024-01-21 | End: 2024-01-25 | Stop reason: HOSPADM

## 2024-01-21 RX ORDER — ONDANSETRON 4 MG/1
4 TABLET, ORALLY DISINTEGRATING ORAL EVERY 6 HOURS PRN
Status: DISCONTINUED | OUTPATIENT
Start: 2024-01-21 | End: 2024-01-25 | Stop reason: HOSPADM

## 2024-01-21 RX ORDER — HYDROXYZINE 50 MG/1
50 TABLET, FILM COATED ORAL EVERY 6 HOURS PRN
Status: DISCONTINUED | OUTPATIENT
Start: 2024-01-21 | End: 2024-01-25 | Stop reason: HOSPADM

## 2024-01-21 RX ORDER — BUPROPION HYDROCHLORIDE 150 MG/1
150 TABLET ORAL DAILY
Status: DISCONTINUED | OUTPATIENT
Start: 2024-01-21 | End: 2024-01-22

## 2024-01-21 RX ORDER — LORAZEPAM 0.5 MG/1
0.5 TABLET ORAL EVERY 4 HOURS PRN
Status: DISCONTINUED | OUTPATIENT
Start: 2024-01-25 | End: 2024-01-25 | Stop reason: HOSPADM

## 2024-01-21 RX ORDER — LORAZEPAM 2 MG/1
2 TABLET ORAL
Status: COMPLETED | OUTPATIENT
Start: 2024-01-22 | End: 2024-01-22

## 2024-01-21 RX ORDER — BENZONATATE 100 MG/1
100 CAPSULE ORAL 3 TIMES DAILY PRN
Status: DISCONTINUED | OUTPATIENT
Start: 2024-01-21 | End: 2024-01-25 | Stop reason: HOSPADM

## 2024-01-21 RX ORDER — HYDRALAZINE HYDROCHLORIDE 25 MG/1
25 TABLET, FILM COATED ORAL DAILY PRN
Status: DISCONTINUED | OUTPATIENT
Start: 2024-01-21 | End: 2024-01-25 | Stop reason: HOSPADM

## 2024-01-21 RX ORDER — ALUMINA, MAGNESIA, AND SIMETHICONE 2400; 2400; 240 MG/30ML; MG/30ML; MG/30ML
15 SUSPENSION ORAL EVERY 6 HOURS PRN
Status: DISCONTINUED | OUTPATIENT
Start: 2024-01-21 | End: 2024-01-25 | Stop reason: HOSPADM

## 2024-01-21 RX ORDER — LORAZEPAM 1 MG/1
1 TABLET ORAL EVERY 4 HOURS PRN
Status: ACTIVE | OUTPATIENT
Start: 2024-01-24 | End: 2024-01-25

## 2024-01-21 RX ORDER — NICOTINE 21 MG/24HR
1 PATCH, TRANSDERMAL 24 HOURS TRANSDERMAL
Status: DISCONTINUED | OUTPATIENT
Start: 2024-01-21 | End: 2024-01-25 | Stop reason: HOSPADM

## 2024-01-21 RX ORDER — CLONIDINE HYDROCHLORIDE 0.1 MG/1
0.1 TABLET ORAL 4 TIMES DAILY PRN
Status: DISCONTINUED | OUTPATIENT
Start: 2024-01-22 | End: 2024-01-22

## 2024-01-21 RX ORDER — DICYCLOMINE HYDROCHLORIDE 10 MG/1
10 CAPSULE ORAL 3 TIMES DAILY PRN
Status: DISCONTINUED | OUTPATIENT
Start: 2024-01-21 | End: 2024-01-25 | Stop reason: HOSPADM

## 2024-01-21 RX ORDER — CLONIDINE HYDROCHLORIDE 0.1 MG/1
0.1 TABLET ORAL ONCE AS NEEDED
Status: DISCONTINUED | OUTPATIENT
Start: 2024-01-25 | End: 2024-01-22

## 2024-01-21 RX ORDER — LORAZEPAM 1 MG/1
1 TABLET ORAL
Status: COMPLETED | OUTPATIENT
Start: 2024-01-24 | End: 2024-01-24

## 2024-01-21 RX ORDER — CLONIDINE HYDROCHLORIDE 0.1 MG/1
0.1 TABLET ORAL 4 TIMES DAILY PRN
Status: DISCONTINUED | OUTPATIENT
Start: 2024-01-21 | End: 2024-01-22

## 2024-01-21 RX ORDER — LORAZEPAM 2 MG/1
2 TABLET ORAL
Status: DISPENSED | OUTPATIENT
Start: 2024-01-21 | End: 2024-01-22

## 2024-01-21 RX ORDER — SODIUM CHLORIDE 0.9 % (FLUSH) 0.9 %
10 SYRINGE (ML) INJECTION AS NEEDED
Status: DISCONTINUED | OUTPATIENT
Start: 2024-01-21 | End: 2024-01-21 | Stop reason: HOSPADM

## 2024-01-21 RX ORDER — CLONIDINE HYDROCHLORIDE 0.1 MG/1
0.1 TABLET ORAL 3 TIMES DAILY PRN
Status: DISCONTINUED | OUTPATIENT
Start: 2024-01-23 | End: 2024-01-22

## 2024-01-21 RX ORDER — LORAZEPAM 0.5 MG/1
0.5 TABLET ORAL
Status: DISCONTINUED | OUTPATIENT
Start: 2024-01-25 | End: 2024-01-25 | Stop reason: HOSPADM

## 2024-01-21 RX ORDER — LORAZEPAM 1 MG/1
1 TABLET ORAL ONCE
Status: COMPLETED | OUTPATIENT
Start: 2024-01-21 | End: 2024-01-21

## 2024-01-21 RX ORDER — BUPROPION HYDROCHLORIDE 150 MG/1
150 TABLET ORAL DAILY
COMMUNITY
End: 2024-01-25 | Stop reason: HOSPADM

## 2024-01-21 RX ORDER — BENZTROPINE MESYLATE 1 MG/ML
1 INJECTION INTRAMUSCULAR; INTRAVENOUS ONCE AS NEEDED
Status: DISCONTINUED | OUTPATIENT
Start: 2024-01-21 | End: 2024-01-25 | Stop reason: HOSPADM

## 2024-01-21 RX ORDER — BENZTROPINE MESYLATE 1 MG/1
2 TABLET ORAL ONCE AS NEEDED
Status: DISCONTINUED | OUTPATIENT
Start: 2024-01-21 | End: 2024-01-25 | Stop reason: HOSPADM

## 2024-01-21 RX ADMIN — Medication 5 MG: at 20:27

## 2024-01-21 RX ADMIN — BUPROPION 150 MG: 150 TABLET, EXTENDED RELEASE ORAL at 20:26

## 2024-01-21 RX ADMIN — SODIUM CHLORIDE 1000 ML: 9 INJECTION, SOLUTION INTRAVENOUS at 08:20

## 2024-01-21 RX ADMIN — HYDROXYZINE HYDROCHLORIDE 50 MG: 50 TABLET ORAL at 18:21

## 2024-01-21 RX ADMIN — LORAZEPAM 2 MG: 2 TABLET ORAL at 21:28

## 2024-01-21 RX ADMIN — LORAZEPAM 1 MG: 1 TABLET ORAL at 08:20

## 2024-01-21 RX ADMIN — CYCLOBENZAPRINE 10 MG: 10 TABLET, FILM COATED ORAL at 20:27

## 2024-01-21 RX ADMIN — NICOTINE TRANSDERMAL SYSTEM 1 PATCH: 21 PATCH, EXTENDED RELEASE TRANSDERMAL at 17:34

## 2024-01-21 RX ADMIN — CLONIDINE HYDROCHLORIDE 0.1 MG: 0.1 TABLET ORAL at 20:26

## 2024-01-21 RX ADMIN — TRAZODONE HYDROCHLORIDE 50 MG: 50 TABLET ORAL at 23:17

## 2024-01-21 NOTE — Clinical Note
[facility] EMERGENCY DEPARTMENT  [address] [address]  Phone: [phone]    Van Heard was seen and treated in our emergency department on 1/21/2024.  He may return to work on 01/22/2024.         Thank you for choosing [facility].    Ronald Decker MD

## 2024-01-21 NOTE — ED PROVIDER NOTES
"Subjective   History of Present Illness  34-year-old white male presents for drug use.  Patient states that he had been clean for the past 5 months, until 2 days ago when he again began using methamphetamine.  This morning he took methamphetamine and fentanyl, because he thought the fentanyl would prevent the methamphetamine from making him feel anxious.  Nevertheless, he complains of anxiety, feels that he is, have a \"heart attack or stroke\".  Complains of palpitations, nervousness, tremors.  Denied other complaints.      Review of Systems   All other systems reviewed and are negative.      Past Medical History:   Diagnosis Date    ADHD (attention deficit hyperactivity disorder)     childhood    Alcohol abuse     Anxiety     Depression     Substance abuse     Suicide attempt     \"I slit my wrist.\" 2009    Withdrawal symptoms, drug or narcotic        No Known Allergies    Past Surgical History:   Procedure Laterality Date    HERNIA REPAIR  2002    WISDOM TOOTH EXTRACTION  2009       Family History   Problem Relation Age of Onset    Alcohol abuse Mother     Alcohol abuse Father     Drug abuse Father        Social History     Socioeconomic History    Marital status: Single   Tobacco Use    Smoking status: Every Day     Packs/day: .25     Types: Cigarettes    Smokeless tobacco: Never    Tobacco comments:     Recently started smoking again.   Substance and Sexual Activity    Alcohol use: Yes     Comment: for approx 3 months a 6 pack or more and 1 pint of bourban a day    Drug use: Yes     Types: Cocaine(coke), Methamphetamines, Benzodiazepines     Comment: suboxone, etoh    Sexual activity: Not Currently     Partners: Female           Objective   Physical Exam  Vitals and nursing note reviewed. Exam conducted with a chaperone present (Carole).   Constitutional:       Appearance: Normal appearance. He is normal weight.   HENT:      Head: Normocephalic and atraumatic.      Mouth/Throat:      Mouth: Mucous membranes are " moist.      Pharynx: Oropharynx is clear.   Eyes:      Extraocular Movements: Extraocular movements intact.      Pupils: Pupils are equal, round, and reactive to light.   Cardiovascular:      Rate and Rhythm: Normal rate and regular rhythm.      Heart sounds: Normal heart sounds. No murmur heard.     No friction rub. No gallop.   Pulmonary:      Effort: Pulmonary effort is normal. No respiratory distress.      Breath sounds: Normal breath sounds. No wheezing, rhonchi or rales.   Abdominal:      General: Abdomen is flat. Bowel sounds are normal. There is no distension.      Palpations: Abdomen is soft.      Tenderness: There is no abdominal tenderness.   Musculoskeletal:         General: Normal range of motion.      Right lower leg: No edema.      Left lower leg: No edema.   Skin:     General: Skin is warm and dry.   Neurological:      General: No focal deficit present.      Mental Status: He is alert and oriented to person, place, and time.   Psychiatric:         Mood and Affect: Mood normal.         Behavior: Behavior normal.         Procedures  Results for orders placed or performed during the hospital encounter of 01/21/24   Comprehensive Metabolic Panel    Specimen: Blood   Result Value Ref Range    Glucose 98 65 - 99 mg/dL    BUN 10 6 - 20 mg/dL    Creatinine 1.47 (H) 0.76 - 1.27 mg/dL    Sodium 133 (L) 136 - 145 mmol/L    Potassium 4.0 3.5 - 5.2 mmol/L    Chloride 100 98 - 107 mmol/L    CO2 22.3 22.0 - 29.0 mmol/L    Calcium 9.8 8.6 - 10.5 mg/dL    Total Protein 7.8 6.0 - 8.5 g/dL    Albumin 4.5 3.5 - 5.2 g/dL    ALT (SGPT) 14 1 - 41 U/L    AST (SGOT) 27 1 - 40 U/L    Alkaline Phosphatase 57 39 - 117 U/L    Total Bilirubin 0.6 0.0 - 1.2 mg/dL    Globulin 3.3 gm/dL    A/G Ratio 1.4 g/dL    BUN/Creatinine Ratio 6.8 (L) 7.0 - 25.0    Anion Gap 10.7 5.0 - 15.0 mmol/L    eGFR 63.0 >60.0 mL/min/1.73   Urinalysis With Microscopic If Indicated (No Culture) - Urine, Clean Catch    Specimen: Urine, Clean Catch   Result  Value Ref Range    Color, UA Dark Yellow (A) Yellow, Straw    Appearance, UA Clear Clear    pH, UA 8.5 (H) 5.0 - 8.0    Specific Gravity, UA 1.023 1.005 - 1.030    Glucose, UA Negative Negative    Ketones, UA Trace (A) Negative    Bilirubin, UA Negative Negative    Blood, UA Negative Negative    Protein,  mg/dL (2+) (A) Negative    Leuk Esterase, UA Negative Negative    Nitrite, UA Negative Negative    Urobilinogen, UA 0.2 E.U./dL 0.2 - 1.0 E.U./dL   Urine Drug Screen - Urine, Clean Catch    Specimen: Urine, Clean Catch   Result Value Ref Range    THC, Screen, Urine Negative Negative    Phencyclidine (PCP), Urine Negative Negative    Cocaine Screen, Urine Negative Negative    Methamphetamine, Ur Positive (A) Negative    Opiate Screen Negative Negative    Amphetamine Screen, Urine Positive (A) Negative    Benzodiazepine Screen, Urine Positive (A) Negative    Tricyclic Antidepressants Screen Negative Negative    Methadone Screen, Urine Negative Negative    Barbiturates Screen, Urine Negative Negative    Oxycodone Screen, Urine Negative Negative    Buprenorphine, Screen, Urine Positive (A) Negative   Ethanol    Specimen: Blood   Result Value Ref Range    Ethanol <10 0 - 10 mg/dL    Ethanol % <0.010 %   Magnesium    Specimen: Blood   Result Value Ref Range    Magnesium 2.1 1.6 - 2.6 mg/dL   CBC Auto Differential    Specimen: Blood   Result Value Ref Range    WBC 8.23 3.40 - 10.80 10*3/mm3    RBC 5.03 4.14 - 5.80 10*6/mm3    Hemoglobin 15.1 13.0 - 17.7 g/dL    Hematocrit 45.8 37.5 - 51.0 %    MCV 91.1 79.0 - 97.0 fL    MCH 30.0 26.6 - 33.0 pg    MCHC 33.0 31.5 - 35.7 g/dL    RDW 12.5 12.3 - 15.4 %    RDW-SD 41.5 37.0 - 54.0 fl    MPV 11.2 6.0 - 12.0 fL    Platelets 228 140 - 450 10*3/mm3    Neutrophil % 77.5 (H) 42.7 - 76.0 %    Lymphocyte % 13.1 (L) 19.6 - 45.3 %    Monocyte % 8.4 5.0 - 12.0 %    Eosinophil % 0.2 (L) 0.3 - 6.2 %    Basophil % 0.4 0.0 - 1.5 %    Immature Grans % 0.4 0.0 - 0.5 %    Neutrophils,  Absolute 6.38 1.70 - 7.00 10*3/mm3    Lymphocytes, Absolute 1.08 0.70 - 3.10 10*3/mm3    Monocytes, Absolute 0.69 0.10 - 0.90 10*3/mm3    Eosinophils, Absolute 0.02 0.00 - 0.40 10*3/mm3    Basophils, Absolute 0.03 0.00 - 0.20 10*3/mm3    Immature Grans, Absolute 0.03 0.00 - 0.05 10*3/mm3    nRBC 0.0 0.0 - 0.2 /100 WBC   Fentanyl, Urine - Urine, Clean Catch    Specimen: Urine, Clean Catch   Result Value Ref Range    Fentanyl, Urine Positive (A) Negative   Urinalysis, Microscopic Only - Urine, Clean Catch    Specimen: Urine, Clean Catch   Result Value Ref Range    RBC, UA None Seen None Seen, 0-2 /HPF    WBC, UA 0-2 None Seen, 0-2 /HPF    Bacteria, UA None Seen None Seen /HPF    Squamous Epithelial Cells, UA 0-2 None Seen, 0-2 /HPF    Hyaline Casts, UA None Seen None Seen /LPF    Methodology Manual Light Microscopy      3 x-ray         ED Course                                             Medical Decision Making  Problems Addressed:  Methamphetamine use: complicated acute illness or injury  Opioid abuse: complicated acute illness or injury    Amount and/or Complexity of Data Reviewed  Labs: ordered.    Risk  Prescription drug management.        Final diagnoses:   Methamphetamine use   Opioid abuse       ED Disposition  ED Disposition       ED Disposition   Discharge    Condition   Stable    Comment   --               No follow-up provider specified.       Medication List      No changes were made to your prescriptions during this visit.            Ronald Decker MD  01/21/24 2951

## 2024-01-21 NOTE — NURSING NOTE
Pt assessment complete. Pt called ER intake from outside the hospital stating that he just left here bc he had took 2 grams of meth (Snort and IV) and they discharged him. Once he was discharged he became suicidal to go jump off a bridge or take a bunch of fentanyl because he doesn't know what else to do anymore.  Pt states he normally drinks alcohol everyday around a 5th of vodka but he hasn't drank since Wednesday 1/17/24. Pt states he last used meth lastnight around 2 grams.   Pt denies any drugs besides meth/ and any etoh at this time.   Pt rates anxiety and depression 10.  Pt COWS-6  Pt CIWA-2    Pt states in 2021 he did have seizures while trying to detox.

## 2024-01-21 NOTE — NURSING NOTE
Patient called intake staff from outside the hospital telling them he needed to be seen and was suicidal.     Intake staff talked with Lead nurse in ER and house supervisor. Let security know and patient escorted back to intake from outside the ER entrance.     Informed pt that he would have to be seen and evaluated by intake due to his statements made on the phone.      Patient states that was waiting outside for a ride from his cousin and had planned on going home first and changing clothes and doing a few things and would come back.     Explained that is not how it goes and due to him saying he was suicidal we could not just let him go.

## 2024-01-21 NOTE — ED PROVIDER NOTES
"Subjective   History of Present Illness  34-year-old white male presents with suicidal ideation.  Patient was seen by me earlier today for methamphetamine and fentanyl use.  He was discharged after workup which was negative except for his drug screen.  He had not left the hospital property.  He came back stating that he was paranoid and felt bad and was afraid that he would never get better.  He reports she was having suicidal thoughts.  He is equivocal regarding those signs at this time.  He denied any plan.  He denied any further drug use since his discharge.      Review of Systems   All other systems reviewed and are negative.      Past Medical History:   Diagnosis Date    ADHD (attention deficit hyperactivity disorder)     childhood    Alcohol abuse     Anxiety     Depression     Substance abuse     Suicide attempt     \"I slit my wrist.\" 2009    Withdrawal symptoms, drug or narcotic        No Known Allergies    Past Surgical History:   Procedure Laterality Date    HERNIA REPAIR  2002    WISDOM TOOTH EXTRACTION  2009       Family History   Problem Relation Age of Onset    Alcohol abuse Mother     Alcohol abuse Father     Drug abuse Father        Social History     Socioeconomic History    Marital status: Single   Tobacco Use    Smoking status: Former     Packs/day: .25     Types: Cigarettes    Smokeless tobacco: Never    Tobacco comments:     Recently started smoking again.   Vaping Use    Vaping Use: Some days    Substances: Nicotine, Flavoring   Substance and Sexual Activity    Alcohol use: Yes     Comment: for approx 3 months a 6 pack or more and 1 pint of bourban a day    Drug use: Yes     Types: Cocaine(coke), Methamphetamines, Benzodiazepines     Comment: suboxone, etoh    Sexual activity: Not Currently     Partners: Female           Objective   Physical Exam  Vitals and nursing note reviewed. Exam conducted with a chaperone present.   Constitutional:       Appearance: Normal appearance. He is normal " weight.   HENT:      Head: Normocephalic and atraumatic.      Mouth/Throat:      Mouth: Mucous membranes are moist.      Pharynx: Oropharynx is clear.   Cardiovascular:      Rate and Rhythm: Normal rate and regular rhythm.      Heart sounds: Normal heart sounds. No murmur heard.     No friction rub. No gallop.   Pulmonary:      Effort: Pulmonary effort is normal. No respiratory distress.      Breath sounds: Normal breath sounds. No wheezing, rhonchi or rales.   Chest:      Chest wall: No tenderness.   Abdominal:      General: Abdomen is flat. Bowel sounds are normal. There is no distension.      Palpations: Abdomen is soft.      Tenderness: There is no abdominal tenderness.   Musculoskeletal:         General: Normal range of motion.      Right lower leg: No edema.      Left lower leg: No edema.   Skin:     General: Skin is warm and dry.   Neurological:      General: No focal deficit present.      Mental Status: He is alert and oriented to person, place, and time.      Comments: Tremulous   Psychiatric:         Mood and Affect: Mood is anxious.         Speech: Speech normal.         Procedures  Results for orders placed or performed during the hospital encounter of 01/21/24   Comprehensive Metabolic Panel    Specimen: Blood   Result Value Ref Range    Glucose 106 (H) 65 - 99 mg/dL    BUN 9 6 - 20 mg/dL    Creatinine 1.34 (H) 0.76 - 1.27 mg/dL    Sodium 135 (L) 136 - 145 mmol/L    Potassium 3.7 3.5 - 5.2 mmol/L    Chloride 99 98 - 107 mmol/L    CO2 22.7 22.0 - 29.0 mmol/L    Calcium 9.4 8.6 - 10.5 mg/dL    Total Protein 7.3 6.0 - 8.5 g/dL    Albumin 4.5 3.5 - 5.2 g/dL    ALT (SGPT) 16 1 - 41 U/L    AST (SGOT) 26 1 - 40 U/L    Alkaline Phosphatase 54 39 - 117 U/L    Total Bilirubin 0.9 0.0 - 1.2 mg/dL    Globulin 2.8 gm/dL    A/G Ratio 1.6 g/dL    BUN/Creatinine Ratio 6.7 (L) 7.0 - 25.0    Anion Gap 13.3 5.0 - 15.0 mmol/L    eGFR 70.4 >60.0 mL/min/1.73   Urinalysis With Microscopic If Indicated (No Culture) - Urine,  Clean Catch    Specimen: Urine, Clean Catch   Result Value Ref Range    Color, UA Yellow Yellow, Straw    Appearance, UA Clear Clear    pH, UA 7.5 5.0 - 8.0    Specific Gravity, UA 1.021 1.005 - 1.030    Glucose, UA Negative Negative    Ketones, UA 15 mg/dL (1+) (A) Negative    Bilirubin, UA Negative Negative    Blood, UA Negative Negative    Protein, UA 30 mg/dL (1+) (A) Negative    Leuk Esterase, UA Negative Negative    Nitrite, UA Negative Negative    Urobilinogen, UA 0.2 E.U./dL 0.2 - 1.0 E.U./dL   Urine Drug Screen - Urine, Clean Catch    Specimen: Urine, Clean Catch   Result Value Ref Range    THC, Screen, Urine Negative Negative    Phencyclidine (PCP), Urine Negative Negative    Cocaine Screen, Urine Negative Negative    Methamphetamine, Ur Positive (A) Negative    Opiate Screen Negative Negative    Amphetamine Screen, Urine Positive (A) Negative    Benzodiazepine Screen, Urine Positive (A) Negative    Tricyclic Antidepressants Screen Negative Negative    Methadone Screen, Urine Negative Negative    Barbiturates Screen, Urine Negative Negative    Oxycodone Screen, Urine Negative Negative    Buprenorphine, Screen, Urine Positive (A) Negative   Ethanol    Specimen: Blood   Result Value Ref Range    Ethanol <10 0 - 10 mg/dL    Ethanol % <0.010 %   Magnesium    Specimen: Blood   Result Value Ref Range    Magnesium 1.9 1.6 - 2.6 mg/dL   CBC Auto Differential    Specimen: Blood   Result Value Ref Range    WBC 10.95 (H) 3.40 - 10.80 10*3/mm3    RBC 4.55 4.14 - 5.80 10*6/mm3    Hemoglobin 13.5 13.0 - 17.7 g/dL    Hematocrit 41.8 37.5 - 51.0 %    MCV 91.9 79.0 - 97.0 fL    MCH 29.7 26.6 - 33.0 pg    MCHC 32.3 31.5 - 35.7 g/dL    RDW 12.4 12.3 - 15.4 %    RDW-SD 41.9 37.0 - 54.0 fl    MPV 10.9 6.0 - 12.0 fL    Platelets 223 140 - 450 10*3/mm3    Neutrophil % 77.5 (H) 42.7 - 76.0 %    Lymphocyte % 13.2 (L) 19.6 - 45.3 %    Monocyte % 8.9 5.0 - 12.0 %    Eosinophil % 0.0 (L) 0.3 - 6.2 %    Basophil % 0.1 0.0 - 1.5 %     Immature Grans % 0.3 0.0 - 0.5 %    Neutrophils, Absolute 8.49 (H) 1.70 - 7.00 10*3/mm3    Lymphocytes, Absolute 1.45 0.70 - 3.10 10*3/mm3    Monocytes, Absolute 0.97 (H) 0.10 - 0.90 10*3/mm3    Eosinophils, Absolute 0.00 0.00 - 0.40 10*3/mm3    Basophils, Absolute 0.01 0.00 - 0.20 10*3/mm3    Immature Grans, Absolute 0.03 0.00 - 0.05 10*3/mm3    nRBC 0.0 0.0 - 0.2 /100 WBC   Fentanyl, Urine - Urine, Clean Catch    Specimen: Urine, Clean Catch   Result Value Ref Range    Fentanyl, Urine Positive (A) Negative   Urinalysis, Microscopic Only - Urine, Clean Catch    Specimen: Urine, Clean Catch   Result Value Ref Range    RBC, UA 3-5 (A) None Seen, 0-2 /HPF    WBC, UA 3-5 (A) None Seen, 0-2 /HPF    Bacteria, UA None Seen None Seen /HPF    Squamous Epithelial Cells, UA None Seen None Seen, 0-2 /HPF    Hyaline Casts, UA 7-12 None Seen /LPF    Methodology Automated Microscopy                 ED Course  ED Course as of 01/21/24 1654   Sun Jan 21, 2024   1610 Medically stable for treatment and evaluation. [BC]      ED Course User Index  [BC] Ronald Decker MD                                             Medical Decision Making  Problems Addressed:  Methamphetamine use: complicated acute illness or injury  Opioid abuse: complicated acute illness or injury  Suicidal ideation: complicated acute illness or injury    Amount and/or Complexity of Data Reviewed  Labs: ordered.        Final diagnoses:   Suicidal ideation   Methamphetamine use   Opioid abuse       ED Disposition  ED Disposition       ED Disposition   DC/Transfer to Behavioral Health    Condition   Stable    Comment   --               No follow-up provider specified.       Medication List      No changes were made to your prescriptions during this visit.            oRnald Decker MD  01/21/24 1694

## 2024-01-21 NOTE — NURSING NOTE
Spoke to  discussed pt assessment, labs, and vitals. New orders to admit pt to [facility] Routine orders SP3.   ED provider made aware.

## 2024-01-21 NOTE — PLAN OF CARE
"Goal Outcome Evaluation:  Plan of Care Reviewed With: patient  Patient Agreement with Plan of Care: agrees     Progress: no change  Outcome Evaluation: Patient presents to Adult Psych for suicidal thoughts and drug abuse. Patient reports he did meth and Heroin Friday night and it made him paranoid and he is having auditory and visual hallucinations. Patient reports he has had about 4 months soberity and has been [facility]. He reports Friday he had apass from rehab and he used 2 grams of meth and 1/2 gram of heroin and \"I fucked up.\" Patient reports he became suicidal and was having \"werid fits\" feels like it runs through my nerves, like every nerve in my body. Patient states, he is still having suicidal thoughts, but no plan a this time. Patient rates anxiety 9/10. Depression 7/10. Cravings 2/10.  Patient states he normally drinks alcohol everyday around a 5th of vodka but he hasn't drank since Wednesday 1/17/24. Patient states he last used meth last night around 2 grams and heroin 1/2 gram. Pt COWS-14  Pt CIWA-10. Patient reports history of withdrawal seizures.                               "

## 2024-01-21 NOTE — NURSING NOTE
Pts sponsor roby Ray came to . Pt sent excuse from the ER earlier, keys with a pocket knife attached. (White powder on knife) to his sponsor per pt request. Security walked keys to sponsor at front door and gave them to the sponsor.

## 2024-01-21 NOTE — DISCHARGE INSTRUCTIONS
Call one of the offices below to establish a primary care provider.  If you are unable to get an appointment and feel it is an emergency and need to be seen immediately please return to the Emergency Department.    Call one of the office below to set up a primary care provider.    Dr. Tonny Ramires                                                                                                       [address] [facility] [address]  [phone]    Dr. Garcia, Dr. BOAZ Pena, Dr. MAVIS Pena ([facility])  [address] [facility] [address]  [phone]    Dr. Coleman, Dr. Barnhart, Dr. Valdes ([facility])  [address] [facility] [address]  [phone]    Dr. Yanez  [address] [facility] [address]  [phone]    Dr. Hernandez, Dr. Tijerina, Dr. Olivier, Dr. Rutherford ([facility])  [address] [place] [address] [address]  [facility] [address]  [phone]    Dr. Jolene Mojica  [address] [facility] [place] [address]  [phone]    Dr. Fabienne Klein  [address]   [address]  [facility] [address]  [phone]    Dr. Ramires  [address] [facility] [address]  [phone]    Dr. Lowery, Dr. Cortes  [address] [facility] [place] [address]  [phone]    Dr. Mccurdy  [address]                                                              [address]  [facility] [address]  [phone]    Dr. Robb  [address] [facility] [address]  [phone]    Dr. Anai Figueroa  [address]  [address]  [place] [address]  [phone]    Dr. Miller and [facility]   [address] [facility]  [address]  [place] [address]  [phone]

## 2024-01-22 PROBLEM — F10.931: Status: ACTIVE | Noted: 2024-01-22

## 2024-01-22 PROCEDURE — 99223 1ST HOSP IP/OBS HIGH 75: CPT | Performed by: PSYCHIATRY & NEUROLOGY

## 2024-01-22 RX ORDER — OLANZAPINE 5 MG/1
5 TABLET, ORALLY DISINTEGRATING ORAL 2 TIMES DAILY
Status: DISCONTINUED | OUTPATIENT
Start: 2024-01-22 | End: 2024-01-24

## 2024-01-22 RX ADMIN — CYCLOBENZAPRINE 10 MG: 10 TABLET, FILM COATED ORAL at 10:45

## 2024-01-22 RX ADMIN — LORAZEPAM 2 MG: 2 TABLET ORAL at 08:17

## 2024-01-22 RX ADMIN — BUPROPION 150 MG: 150 TABLET, EXTENDED RELEASE ORAL at 08:17

## 2024-01-22 RX ADMIN — OLANZAPINE 5 MG: 5 TABLET, ORALLY DISINTEGRATING ORAL at 10:45

## 2024-01-22 RX ADMIN — NICOTINE TRANSDERMAL SYSTEM 1 PATCH: 21 PATCH, EXTENDED RELEASE TRANSDERMAL at 08:17

## 2024-01-22 RX ADMIN — Medication 5 MG: at 20:10

## 2024-01-22 RX ADMIN — HYDROXYZINE HYDROCHLORIDE 50 MG: 50 TABLET ORAL at 10:45

## 2024-01-22 RX ADMIN — LORAZEPAM 2 MG: 2 TABLET ORAL at 21:15

## 2024-01-22 RX ADMIN — HYDROXYZINE HYDROCHLORIDE 50 MG: 50 TABLET ORAL at 20:10

## 2024-01-22 RX ADMIN — LORAZEPAM 2 MG: 2 TABLET ORAL at 14:51

## 2024-01-22 RX ADMIN — OLANZAPINE 5 MG: 5 TABLET, ORALLY DISINTEGRATING ORAL at 20:10

## 2024-01-22 NOTE — PLAN OF CARE
Problem: Adult Behavioral Health Plan of Care  Goal: Plan of Care Review  Outcome: Ongoing, Progressing  Flowsheets (Taken 1/22/2024 1335)  Consent Given to Review Plan with:   Mother   Sarai  Progress: no change  Plan of Care Reviewed With:   patient   mother  Patient Agreement with Plan of Care: agrees  Outcome Evaluation: New admit. Completed social history and integrated summary  Goal: Patient-Specific Goal (Individualization)  Outcome: Ongoing, Progressing  Flowsheets  Taken 1/22/2024 1335 by Tennille Faye  Patient-Specific Goals (Include Timeframe): Patient will deny SI/HI prior to discharge. Patient will identify 1 healthy coping skill for depression prior to discharge. Patient will consent to appropriate aftercare plan prior to discharge.  Individualized Care Needs: Therapist will offer 1-4 individual sessions, family education, aftercare planning  Taken 1/22/2024 1323 by Tennille Faye  Patient Personal Strengths:   motivated for treatment   resilient   resourceful   expressive of needs   community support   family/social support   positive educational history   positive vocational history   realistic evaluation of current/future capabilities   stable living environment   spiritual/Advent support  Patient Vulnerabilities:   adverse childhood experience(s)   substance abuse/addiction   history of unsuccessful treatment   poor impulse control   lacks insight into illness  Taken 1/21/2024 1824 by Betty Orr RN  Anxieties, Fears or Concerns: none voiced  Goal: Optimized Coping Skills in Response to Life Stressors  Outcome: Ongoing, Progressing  Intervention: Promote Effective Coping Strategies  Flowsheets (Taken 1/22/2024 1335)  Supportive Measures:   active listening utilized   counseling provided  Goal: Develops/Participates in Therapeutic [place] to Support Successful Transition  Outcome: Ongoing, Progressing  Intervention: Foster Therapeutic [place]  Flowsheets  (Taken 1/22/2024 1335)  Trust Relationship/Rapport:   care explained   questions encouraged   reassurance provided   choices provided   emotional support provided   thoughts/feelings acknowledged   empathic listening provided   questions answered  Intervention: Mutually Develop Transition Plan  Flowsheets  Taken 1/22/2024 1335  Outpatient/Agency/Support Group Needs: ([facility]; [facility])   outpatient psychiatric care (specify)   outpatient substance abuse treatment (specify)   outpatient counseling  Transition Support:   follow-up care discussed   community resources reviewed   crisis management plan promoted   crisis management plan verbalized   follow-up care coordinated  Anticipated Discharge Disposition: ([facility]; [facility]) other (see comments)  Taken 1/22/2024 1332  Discharge Coordination/Progress: Patient has [facility] and signed consent for [employer]  Concerns Comments: NA  Transportation Anticipated: car, drives self  Transportation Concerns: other (see comments)  Current Discharge Risk:   psychiatric illness   substance use/abuse  Concerns to be Addressed:   mental health   medication   substance/tobacco abuse/use   discharge planning   coping/stress   compliance issue   cognitive/perceptual  Readmission Within the Last 30 Days: current reason for admission unrelated to previous admission  Patient/Family Anticipated Services at Transition: ([facility])   outpatient care   rehabilitation services  Patient's Choice of Community Agency(s): [employer] [place]  Patient/Family Anticipates Transition to: (sober living; [facility]) other (see comments)  Offered/Gave Vendor List: no   Goal Outcome Evaluation:  Plan of Care Reviewed With: patient, mother  Patient Agreement with Plan of Care: agrees  Consent Given to Review Plan with: MotherBelen Moon  Progress: no change  Outcome Evaluation: New admit. Completed social history and integrated summary

## 2024-01-22 NOTE — H&P
INITIAL PSYCHIATRIC HISTORY & PHYSICAL    Patient Identification:  Name:    Van Heard   Age:   34 y.o.  Sex:   male  :   1990  MRN:   [id number]  Visit Number:   [id number]  Primary Care Physician:   Provider, No Known  Admission Date: 2024    SUBJECTIVE    CC/Focus of Exam: Psychosis    Informant: The patient who ability to adequately relate his HPI is questionable.    HPI:     [facility] admission for Mr. Heard a 34-year-old single (never  no children) high school educated non [employer] attending [employer] employed (has worked at the [employer] here in [place] for the past 2 to 3 months)  male who reports having completed 4 years of sobriety till he resumed drinking approximately 1 month ago combining abuse of opiates and methamphetamine 2 to 3 days PTA.  Drinking an average of one fifth per day abruptly stopping 3 to 4 days prior to admission.  Has been experiencing auditory and visual hallucinations, anxiety associated with paranoid thoughts, confusion as to where he is or has been was memory deficits.  Patient had recently experienced suicidal ideation thinking he would overdose on fentanyl and in fact his urine drug screen positive at admission was positive for benzodiazepines buprenorphine methamphetamine.  Patient states he is currently living at the sober living facility and states that he has no outstanding legal issues, that he is uncertain about where he is currently.  Patient is admitted on a voluntary basis and participating volatile treatment plan.    Available medical/psychiatric records reviewed and incorporated into the current document.     PAST PSYCHIATRIC HX: Prior admission here in 2018, see record    SUBSTANCE USE HX:         Patient reports Amphetamine use starting at age 12, using 2-3 grams, last use 22.    Cocaine use starting at age 14 last use    Opiate use starting at age 18, Fentanyl using 1/2 gram, last use  "1/20/24.    Alcohol starting age 12 drinking case of beer to 5th of vodka, last drink 1/17/23.             FAMILY HX: Patient states his maternal and paternal grand fathers were alcohol dependent, that is half-sister also abused drugs.  No suicides among first-degree relatives.    SOCIAL HX: Patient raised by his parents till age 13 when they .  Denies any history of having been abused during his youth.  Has 1 DUI and other arrests for public intoxication, no felonies, no current pending legal issues.  [facility] he has been living at the [facility].   has been working at the [employer] here in [place] for 3 to 4 months PTA.    Past Medical History:   Diagnosis Date    ADHD (attention deficit hyperactivity disorder)     childhood    Alcohol abuse     Anxiety     Depression     Substance abuse     Suicide attempt     \"I slit my wrist.\" 2009    Withdrawal symptoms, drug or narcotic        Past Surgical History:   Procedure Laterality Date    HERNIA REPAIR  2002    WISDOM TOOTH EXTRACTION  2009       Family History   Problem Relation Age of Onset    Alcohol abuse Mother     Alcohol abuse Father     Drug abuse Father          Medications Prior to Admission   Medication Sig Dispense Refill Last Dose    buPROPion XL (WELLBUTRIN XL) 150 MG 24 hr tablet Take 1 tablet by mouth Daily.   1/20/2024    melatonin 5 MG tablet tablet Take 1 tablet by mouth At Night As Needed (sleep).   1/20/2024           ALLERGIES:  Patient has no known allergies.    Temp:  [97.4 °F (36.3 °C)-98.7 °F (37.1 °C)] 97.4 °F (36.3 °C)  Heart Rate:  [] 115  Resp:  [18-19] 18  BP: (111-150)/(60-96) 129/78    REVIEW OF SYSTEMS:  Review of Systems   Constitutional: Negative.    HENT: Negative.     Eyes: Negative.    Respiratory: Negative.     Cardiovascular: Negative.  Positive for palpitations.   Gastrointestinal: Negative.    Endocrine: Negative.    Genitourinary: Negative.    Musculoskeletal: " Negative.    Skin: Negative.    Allergic/Immunologic: Negative.    Neurological: Negative.         Patient states he has a history of seizures during alcohol withdrawal.   Hematological: Negative.       See HPI for psychiatric ROS  OBJECTIVE    PHYSICAL EXAM:  Physical Exam  Vitals and nursing note reviewed. Exam conducted with a chaperone present.   Constitutional:       Appearance: Normal appearance. He is normal weight.   HENT:      Head: Normocephalic and atraumatic.      Right Ear: External ear normal.      Left Ear: External ear normal.      Nose: Nose normal.      Mouth/Throat:      Pharynx: Oropharynx is clear.   Eyes:      Extraocular Movements: Extraocular movements intact.      Conjunctiva/sclera: Conjunctivae normal.      Pupils: Pupils are equal, round, and reactive to light.   Cardiovascular:      Rate and Rhythm: Normal rate and regular rhythm.      Pulses: Normal pulses.   Pulmonary:      Effort: Pulmonary effort is normal.   Abdominal:      General: Abdomen is flat. Bowel sounds are normal.      Palpations: Abdomen is soft.   Musculoskeletal:         General: Normal range of motion.      Cervical back: Normal range of motion and neck supple.   Skin:     General: Skin is warm and dry.   Neurological:      General: No focal deficit present.      Mental Status: He is alert and oriented to person, place, and time. Mental status is at baseline.         MENTAL STATUS EXAM:   Hygiene:   fair  Cooperation:  Cooperative   Eye Contact:  Fair  Psychomotor Behavior:  Restless  Affect:  Inappropriate, very anxious and fearful.   Hopelessness: 5  Speech:  Pressured  Thought Progression: Disorganized  Thought Content:  Bizarre  Suicidal:  Suicidal Ideation  Homicidal:  None  Hallucinations:  Auditory and Visual  Delusion:  Paranoid  Memory:   History of blackouts  Orientation:   Disoriented to place but not time nor person  Reliability:  poor  Insight:  Poor  Judgement:  Impaired  Impulse Control:  Poor    Imaging  Results (Last 24 Hours)       ** No results found for the last 24 hours. **             ECG/EMG Results (most recent)       Procedure Component Value Units Date/Time    ECG 12 Lead Other; Baseline Cardiac Status [[id number]] Collected: 01/21/24 1721     Updated: 01/21/24 1722     QT Interval 384 ms      QTC Interval 462 ms     Narrative:      Test Reason : Other~  Blood Pressure :   */*   mmHG  Vent. Rate :  87 BPM     Atrial Rate :  87 BPM     P-R Int : 130 ms          QRS Dur : 120 ms      QT Int : 384 ms       P-R-T Axes :  72  27  80 degrees     QTc Int : 462 ms    Normal sinus rhythm  Right bundle branch block  Abnormal ECG  When compared with ECG of 22-MAR-2018 02:08,  Vent. rate has increased BY  36 BPM  Right bundle branch block has replaced Nonspecific intraventricular conduction delay    Referred By: MARGY           Confirmed By:     ECG 12 Lead Chest Pain [[id number]] Collected: 01/21/24 2010     Updated: 01/21/24 2011     QT Interval 370 ms      QTC Interval 467 ms     Narrative:      Test Reason : Chest Pain  Blood Pressure :   */*   mmHG  Vent. Rate :  96 BPM     Atrial Rate :  96 BPM     P-R Int : 134 ms          QRS Dur : 116 ms      QT Int : 370 ms       P-R-T Axes :  73  24  72 degrees     QTc Int : 467 ms    Normal sinus rhythm  Incomplete right bundle branch block  Borderline ECG  When compared with ECG of 21-JAN-2024 17:21, (Unconfirmed)  Incomplete right bundle branch block has replaced Right bundle branch block    Referred By:            Confirmed By:              Lab Results   Component Value Date    GLUCOSE 106 (H) 01/21/2024    BUN 9 01/21/2024    CREATININE 1.34 (H) 01/21/2024    EGFRIFNONA 80 03/21/2018    BCR 6.7 (L) 01/21/2024    CO2 22.7 01/21/2024    CALCIUM 9.4 01/21/2024    ALBUMIN 4.5 01/21/2024    AST 26 01/21/2024    ALT 16 01/21/2024       Lab Results   Component Value Date    WBC 10.95 (H) 01/21/2024    HGB 13.5 01/21/2024    HCT 41.8 01/21/2024    MCV 91.9 01/21/2024      01/21/2024       Pain Management Panel  More data may exist         Latest Ref Rng & Units 1/21/2024 3/21/2018   Pain Management Panel   Amphetamine, Urine Qual Negative Positive  Positive  Positive    Barbiturates Screen, Urine Negative Negative  Negative  Negative    Benzodiazepine Screen, Urine Negative Positive  Positive  Positive    Buprenorphine, Screen, Urine Negative Positive  Positive  Positive    Cocaine Screen, Urine Negative Negative  Negative  Negative    Fentanyl, Urine Negative Positive  Positive  -   Methadone Screen , Urine Negative Negative  Negative  Negative    Methamphetamine, Ur Negative Positive  Positive  -       Brief Urine Lab Results  (Last result in the past 365 days)        Color   Clarity   Blood   Leuk Est   Nitrite   Protein   CREAT   Urine HCG        01/21/24 1601 Yellow   Clear   Negative   Negative   Negative   30 mg/dL (1+)                   Reviewed labs and studies done with this admission.     Hospital bed: No  ASSESSMENT & PLAN:      Alcohol withdrawal with delirium  Ativan detox protocol plus Zyprexa, will be endorsing is continuing involvement with chemical dependency treatment programs post hospital.  Individual groups psychotherapeutic effort.    Benzodiazepine abuse with withdrawal symptoms  Ativan detox protocol    Opioid dependency  Probably will not require detox given his brief but serious abuse including fentanyl abuse PTA    Suicidal ideation  Precautions          The patient has been admitted for safety and stabilization.  Patient will be monitored for suicidality daily and maintained on Special Precautions Level 3 (q15 min checks) . The patient will have individual and group therapy with a master's level therapist. A master treatment plan will be developed and agreed upon by the patient and his/her treatment team.  The patient's estimated length of stay in the hospital is 5-7 days.       I spent a total of 75 minutes in direct patient care, greater than 40 minutes  (greater than 50%) were spent face-to-face with assessment, coordination of care, counseling,  and answering any questions the patient had regarding  his status and the treatment plan.     HEMA Lowe MD    01/22/24  10:43 AM EST

## 2024-01-22 NOTE — PROGRESS NOTES
Navigator is helping with the following referral:    [facility]  [phone]  -Left message. 1/22

## 2024-01-22 NOTE — PROGRESS NOTES
[id number]      DATA:      Therapist met individually with patient this date to introduce role and to discuss hospitalization expectations. Patient agreeable. Reviewed medical record and staffed case with treatment team this date. No major issues identified.       Clinical Maneuvering/Intervention:     Therapist assisted patient in processing above session content; acknowledged and normalized patient’s thoughts, feelings, and concerns.  Discussed the therapist/patient relationship and explain the parameters and limitations of relative confidentiality.  Also discussed the importance of active participation, and honesty to the treatment process.  Encouraged the patient to discuss/vent their feelings, frustrations, and fears concerning their ongoing medical issues and validated their feelings.     Allowed patient to freely discuss issues without interruption or judgment. Provided safe, confidential environment to facilitate the development of positive therapeutic relationship and encourage open, honest communication.      Concern was voiced regarding substance use and educated patient on risks associated with use. Patient was advised to abstain from use and educated on community resources that can help with sobriety and recovery.  Patient signed consent for [facility]     Therapist addressed discharge safety planning this date. Assisted patient in identifying risk factors which would indicate the need for higher level of care after discharge;  including thoughts to harm self or others and/or self-harming behavior. Encouraged patient to call [phone],  [phone], or present to the nearest emergency room should any of these events occur. Discussed crisis intervention services and means to access.  Encouraged securing any objects of harm.       Therapist completed integrated summary, treatment plan, and initiated social history this date.  Therapist is strongly encouraging family involvement in treatment.       ASSESSMENT:  "     The patient is a 34 year old  male. Per report, \"34-year-old white male presents with suicidal ideation.  Patient was seen by me earlier today for methamphetamine and fentanyl use.  He was discharged after workup which was negative except for his drug screen.  He had not left the hospital property.  He came back stating that he was paranoid and felt bad and was afraid that he would never get better.  He reports she was having suicidal thoughts.  He is equivocal regarding those signs at this time.  He denied any plan.  He denied any further drug use since his discharge.\" Also per ER intake, \"Pt assessment complete. Pt called ER intake from outside the hospital stating that he just left here bc he had took 2 grams of meth (Snort and IV) and they discharged him. Once he was discharged he became suicidal to go jump off a bridge or take a bunch of fentanyl because he doesn't know what else to do anymore.\"    Today, patient was seen 1-1 in the office. Patient calm, cooperative, and oriented x 2. Patient noted to have inappropriate affect, patient appears restless, anxious.  Patient's thought content appears disorganized and bizarre at times.  He admits that he has visions and hears voices but does not elaborate.  Patient appears paranoid at times, scanning the room.  Patient Patient rates depression at 8/10 and anxiety at 7/10.  Patient reports that he got with the wrong crowd and suffered a relapse.     Patient signed consent for his mother Sarai at [phone]; Sarai was provided with update about patient's admission and hospitalization expectations. Patient agreeable.      Goals for treatment: Hearing things      Prior Hospitalizations / Dates/current treatment:  [facility] admission.  History of [facility] [place]. Currently at [facility].      Childhood History:  Raised in [place] Raised by father and mother. Great childhood. Teenager, got into traumatic stuff      Suicide " Attempts:  2 prior attempts 2010, cut wrists.      Alcohol:   Alcohol starting age 12 drinking case of beer to 5th of vodka, last drink 1/17/23.     Substance use: Patient reports Amphetamine use starting at age 12, using 2-3 grams, last use 1/20/24.  Cocaine use starting at age 14 last use 2022  Opiate use starting at age 18, Fentanyl using 1/2 gram, last use 1/20/24. Longest time of sobriety 11 months.      Legal:  no outstanding issues     Relationship/support: Single, no children     Spiritual:  Oriental orthodox      Education:  12th grade educated.  Employed [employer]. Currently residing at [facility].      Access to firearms:  Denies         PLAN:       Patient to remain hospitalized this date.      Treatment team will focus efforts on stabilizing patient's acute symptoms while providing education on healthy coping and crisis management to reduce hospitalizations.   Patient requires daily psychiatrist evaluation and 24/7 nursing supervision to promote patient  safety.     Therapist will offer 1-4 individual sessions, family education, and appropriate referral.     Therapist recommends continued evaluation. Patient signed consent for [facility] sober living.

## 2024-01-22 NOTE — NURSING NOTE
Spoke to patient to obtain drug history per MD order. Patient reports      Amphetamine use starting at age 12, using 2-3 grams, last use 1/20/24.       Cocaine use starting at age 14 last use 2022      Opiate use starting at age 18, Fentanyl using 1/2 gram, last use 1/20/24.       Alcohol starting age 12 drinking case of beer to 5th of vodka, last drink 1/17/23.

## 2024-01-22 NOTE — PLAN OF CARE
Goal Outcome Evaluation:  Plan of Care Reviewed With: patient  Patient Agreement with Plan of Care: agrees     Progress: improving  Outcome Evaluation: Rates Anx 8 dep 7 Denies SI/HI reports hearing voices educated while encouraging pt to utilize PRN medication for constipation. No acute distress noted will continue to monitor

## 2024-01-22 NOTE — PLAN OF CARE
Goal Outcome Evaluation:  Plan of Care Reviewed With: patient  Patient Agreement with Plan of Care: agrees     Progress: no change  Outcome Evaluation: Patient became increasingly paranoid this shift, patient repsonding to internal and irritable at times. Patient states that he is really worried about his health and that he knows that he has overdone it with drugs this time. Patient also c/o chest pain this shift, chest pain protocol initated. patients ciwa 9 and cows 19, prn medication administered. patients w/d symptoms are tremors, muscle twitching, anxiety, sweating, neausea,restless and paranoid. Patient also c/o hesitancy with urine and kidney pain. new drug history obtained per md order, see note. patient rates anxiety 8 and depression 7. denies SI, HI and AVH.

## 2024-01-22 NOTE — NURSING NOTE
"Patient increasingly anxious, restless and states \"I feel like every nerve in my body is fucked, I know I way over done it this time\". Patient paranoid and responding to internal stimuli.     Patient also reporting chest pain. Chest pain protocol initiated. No s/s of distress noted.      Patient scored CIWA 9 and COWS 19.     Vitals   Temp  98.3  Bp- 150/96 Manual   Hr- 102   02- 99%     Spoke to Dr. Rodriguez and made him aware of patients cows, ciwa, vitals and EKG received order for Clonidine and Ativan detox protocol and to obtain an accurate drug history. RBVOx2.           "

## 2024-01-23 LAB
QT INTERVAL: 370 MS
QT INTERVAL: 384 MS
QTC INTERVAL: 462 MS
QTC INTERVAL: 467 MS

## 2024-01-23 PROCEDURE — 99232 SBSQ HOSP IP/OBS MODERATE 35: CPT | Performed by: PSYCHIATRY & NEUROLOGY

## 2024-01-23 RX ADMIN — Medication 5 MG: at 21:56

## 2024-01-23 RX ADMIN — LORAZEPAM 1.5 MG: 1 TABLET ORAL at 21:08

## 2024-01-23 RX ADMIN — LORAZEPAM 1.5 MG: 1 TABLET ORAL at 08:40

## 2024-01-23 RX ADMIN — OLANZAPINE 5 MG: 5 TABLET, ORALLY DISINTEGRATING ORAL at 08:40

## 2024-01-23 RX ADMIN — LORAZEPAM 1.5 MG: 1 TABLET ORAL at 14:31

## 2024-01-23 RX ADMIN — OLANZAPINE 5 MG: 5 TABLET, ORALLY DISINTEGRATING ORAL at 21:08

## 2024-01-23 RX ADMIN — NICOTINE TRANSDERMAL SYSTEM 1 PATCH: 21 PATCH, EXTENDED RELEASE TRANSDERMAL at 08:42

## 2024-01-23 NOTE — NURSING NOTE
Dr. Lowe was notified that pt /62 , CIWA 13 COWS 10 and he is on the Ativan Detox. Dr. Lowe confirmed that it was okay to give Ativan per protocol.

## 2024-01-23 NOTE — PROGRESS NOTES
Navigator is helping with the following referral:     [facility] [phone]  -Left message. 1/22  -Spoke with [facility]. They will accept patient at discharge.  1/23

## 2024-01-23 NOTE — PLAN OF CARE
Problem: Adult Behavioral Health Plan of Care  Goal: Optimized Coping Skills in Response to Life Stressors  Outcome: Ongoing, Progressing  Intervention: Promote Effective Coping Strategies  Flowsheets (Taken 1/23/2024 1355)  Supportive Measures: active listening utilized       1356:     DATA:    Therapist met with the patient individually. Therapist continues reviewing plan of care and aftercare plan.  The patient was agreeable.    ASSESSMENT:    Patient was seen for follow up of Alcohol withdrawal with delirium. Benzodiazepine abuse with withdrawal symptoms. Opioid dependency. Suicidal ideation     Today, patient was seen 1-1 at bedside. Patient calm, cooperative and oriented x 3. Patient noted to have restricted affect, linear thought processes, congruent mood. Patient denies SI/HI.  He is noted to be restless somewhat in bed; he endorses feeling restless and body aches and requests to rest.  Patient is accepted back to [facility] at discharge and he has met with the  this morning.  He asked about his next dose of detox medication. Therapist staffed with RN staff and confirmed that he can take Ativan after 2 p.m. today. Patient agreeable.     CLINICAL MANEUVERING:    Therapist provided safe, secure environment for patient to share.  Provided reflective listening and psychoeducation.  Assisted patient in processing the above session. Assisted patient in identifying any questions or needs to be addressed today.        Concern was voiced regarding substance use and educated patient on risks associated with use. Patient was advised to abstain from use and educated on community resources that can help with sobriety and recovery. Patient signed consent to return to [facility].       Plan:     Patient to remain hospitalized this date.      Treatment team will focus efforts on stabilizing patient's acute symptoms while providing education on healthy coping and crisis management to reduce hospitalizations.    Patient requires daily psychiatrist evaluation and 24/7 nursing supervision to promote patient  safety.     Therapist will offer 1-4 individual sessions, family education, and appropriate referral.     Therapist recommends continued assessment.  Patient to return to [facility] at discharge. Their agency will transport.

## 2024-01-23 NOTE — PLAN OF CARE
"Goal Outcome Evaluation:  Plan of Care Reviewed With: patient  Patient Agreement with Plan of Care: agrees     Progress: improving  Outcome Evaluation: Pt calm and cooperative with staff. Pt attending some groups and interacting appropriately with peers. Pt states appetite good; reports difficulty falling and staying asleep. Pt rates anxiety 8/10 and depression 5/10. Pt denies SI/HI; when asked about AVH, state, \"not really this morning\". Pt made comment that was \"disappointed in himeself\". Pt rates cravings at a 5. AM CIWA 7, COWS 5.                               "

## 2024-01-23 NOTE — PLAN OF CARE
Goal Outcome Evaluation:  Plan of Care Reviewed With: patient  Patient Agreement with Plan of Care: agrees         Pt rated anxiety 4/10, depression 6/10, and cravings 5/10. Pt denies SI/HI/AVH. Pt reported that he sleeping better now. Pt doesn't voice any other concerns at this time. No acute s/s of distress noted.

## 2024-01-23 NOTE — PROGRESS NOTES
"INPATIENT PSYCHIATRIC PROGRESS NOTE    Name:  Van Heard  :  1990  MRN:  [id number]  Visit Number:  [id number]  Length of stay:  2    Behavioral Health Treatment Plan and Problem List: I have reviewed and approved the Behavioral Health Treatment Plan and Problem list.    SUBJECTIVE    CC/ Focus of exam:  depression/ delirium/ substance abuse (primarily alcohol)    Patient's subjective status: \"a little betterr\"    INTERVAL HISTORY:     Restless sleep with active visual and auditory hallucinations, no paranoia, anxious with fine motor tremor. No SI and trace of optimism regarding his recovery effort.  Patient confides that his recovery effort relies on his malik and endorses suggestion he talk with the hospital hailee.     Hesitantly agrees with returning to the [facility] program post detox.     Depression rating 4-5/10  Anxiety rating 8/10  Hopelessness: 2-3/10  Sleep:restless with hallucinations  Withdrawal sx:fine tremor  Cravin-3 (ETOH)       ROS: No cardiovascular, GI, or Neurological complaints.       OBJECTIVE    Vitals:    24 0758   BP: 115/60   Pulse: 60   Resp: 18   Temp: 97.1 °F (36.2 °C)   SpO2: 97%      Temp:  [96.9 °F (36.1 °C)-97.8 °F (36.6 °C)] 97.1 °F (36.2 °C)  Heart Rate:  [] 60  Resp:  [18] 18  BP: ()/(60-65) 115/60    MENTAL STATUS EXAM:       Psychomotor: No psychomotor agitation/retardation, No EPS, No motor tics  Speech-normal rate, amount.  Mood/Affect: Depressed  Thought Processes: linear, logical, and goal directed  Thought Content: mood congruent  Hallucination(s): auditory and visual  Hopelessness: Yes  Optimistic: minimally  Suicidal Thoughts: No  Suicidal Plan/Intent: No  Homicidal Thoughts: absent  Orientation: Time, place and person.   Memory:  Intact aside from history of blackouts associated with intoxication    Lab Results (last 24 hours)       ** No results found for the last 24 hours. **             Imaging Results (Last 24 Hours)       ** No " results found for the last 24 hours. **             Lab and x-ray studies reviewed.    ECG/EMG Results (most recent)       Procedure Component Value Units Date/Time    ECG 12 Lead Other; Baseline Cardiac Status [[id number]] Collected: 01/21/24 1721     Updated: 01/21/24 1722     QT Interval 384 ms      QTC Interval 462 ms     Narrative:      Test Reason : Other~  Blood Pressure :   */*   mmHG  Vent. Rate :  87 BPM     Atrial Rate :  87 BPM     P-R Int : 130 ms          QRS Dur : 120 ms      QT Int : 384 ms       P-R-T Axes :  72  27  80 degrees     QTc Int : 462 ms    Normal sinus rhythm  Right bundle branch block  Abnormal ECG  When compared with ECG of 22-MAR-2018 02:08,  Vent. rate has increased BY  36 BPM  Right bundle branch block has replaced Nonspecific intraventricular conduction delay    Referred By: MARGY           Confirmed By:     ECG 12 Lead Chest Pain [[id number]] Collected: 01/21/24 2010     Updated: 01/21/24 2011     QT Interval 370 ms      QTC Interval 467 ms     Narrative:      Test Reason : Chest Pain  Blood Pressure :   */*   mmHG  Vent. Rate :  96 BPM     Atrial Rate :  96 BPM     P-R Int : 134 ms          QRS Dur : 116 ms      QT Int : 370 ms       P-R-T Axes :  73  24  72 degrees     QTc Int : 467 ms    Normal sinus rhythm  Incomplete right bundle branch block  Borderline ECG  When compared with ECG of 21-JAN-2024 17:21, (Unconfirmed)  Incomplete right bundle branch block has replaced Right bundle branch block    Referred By:            Confirmed By:              ALLERGIES: Patient has no known allergies.    Medications:     LORazepam, 1.5 mg, Oral, 3 times per day   Followed by  [START ON 1/24/2024] LORazepam, 1 mg, Oral, 3 times per day   Followed by  [START ON 1/25/2024] LORazepam, 0.5 mg, Oral, 3 times per day  nicotine, 1 patch, Transdermal, Q24H  OLANZapine zydis, 5 mg, Oral, BID       ASSESSMENT & PLAN    Alcohol withdrawal with delirium  Ativan detox protocol plus Zyprexa, will be  endorsing is continuing involvement with chemical dependency treatment programs post hospital.  Individual groups psychotherapeutic effort.     Benzodiazepine abuse with withdrawal symptoms  Ativan detox protocol     Opioid dependency  Probably will not require detox given his brief but serious abuse including fentanyl abuse PTA     Suicidal ideation  Precautions      Special precautions: Special Precautions Level 3 (q15 min checks)     Behavioral Health Treatment Plan and Problem List: I have reviewed and approved the Behavioral Health Treatment Plan and Problem list.    I spent a total of 26 minutes in direct patient care including  17 minutes face to face with the patient assessment, coordination of care, and counseling the patient on the current and follow-up treatment plans regarding his status and situation.  Patient had no additional questions.     HEMA Lowe MD    Clinician:  Hai Lowe MD  01/23/24  08:32 EST    Dictated utilizing Dragon dictation

## 2024-01-24 LAB
ALBUMIN SERPL-MCNC: 3.7 G/DL (ref 3.5–5.2)
ALBUMIN/GLOB SERPL: 1.5 G/DL
ALP SERPL-CCNC: 47 U/L (ref 39–117)
ALT SERPL W P-5'-P-CCNC: 9 U/L (ref 1–41)
ANION GAP SERPL CALCULATED.3IONS-SCNC: 6.7 MMOL/L (ref 5–15)
AST SERPL-CCNC: 15 U/L (ref 1–40)
BILIRUB SERPL-MCNC: <0.2 MG/DL (ref 0–1.2)
BUN SERPL-MCNC: 15 MG/DL (ref 6–20)
BUN/CREAT SERPL: 12.9 (ref 7–25)
CALCIUM SPEC-SCNC: 9.2 MG/DL (ref 8.6–10.5)
CHLORIDE SERPL-SCNC: 105 MMOL/L (ref 98–107)
CO2 SERPL-SCNC: 30.3 MMOL/L (ref 22–29)
CREAT SERPL-MCNC: 1.16 MG/DL (ref 0.76–1.27)
EGFRCR SERPLBLD CKD-EPI 2021: 83.7 ML/MIN/1.73
GLOBULIN UR ELPH-MCNC: 2.4 GM/DL
GLUCOSE SERPL-MCNC: 112 MG/DL (ref 65–99)
POTASSIUM SERPL-SCNC: 3.9 MMOL/L (ref 3.5–5.2)
PROT SERPL-MCNC: 6.1 G/DL (ref 6–8.5)
SODIUM SERPL-SCNC: 142 MMOL/L (ref 136–145)

## 2024-01-24 PROCEDURE — 80053 COMPREHEN METABOLIC PANEL: CPT | Performed by: PSYCHIATRY & NEUROLOGY

## 2024-01-24 PROCEDURE — 99232 SBSQ HOSP IP/OBS MODERATE 35: CPT | Performed by: PSYCHIATRY & NEUROLOGY

## 2024-01-24 RX ORDER — OLANZAPINE 5 MG/1
5 TABLET, ORALLY DISINTEGRATING ORAL DAILY
Status: DISCONTINUED | OUTPATIENT
Start: 2024-01-25 | End: 2024-01-25 | Stop reason: HOSPADM

## 2024-01-24 RX ADMIN — Medication 5 MG: at 20:50

## 2024-01-24 RX ADMIN — NICOTINE TRANSDERMAL SYSTEM 1 PATCH: 21 PATCH, EXTENDED RELEASE TRANSDERMAL at 08:23

## 2024-01-24 RX ADMIN — LORAZEPAM 1 MG: 1 TABLET ORAL at 21:04

## 2024-01-24 RX ADMIN — LORAZEPAM 1 MG: 1 TABLET ORAL at 08:23

## 2024-01-24 RX ADMIN — LORAZEPAM 1 MG: 1 TABLET ORAL at 15:02

## 2024-01-24 RX ADMIN — HYDROXYZINE HYDROCHLORIDE 50 MG: 50 TABLET ORAL at 20:50

## 2024-01-24 NOTE — PLAN OF CARE
Problem: Adult Behavioral Health Plan of Care  Goal: Optimized Coping Skills in Response to Life Stressors  Outcome: Ongoing, Progressing  Intervention: Promote Effective Coping Strategies  Flowsheets (Taken 1/24/2024 0902)  Supportive Measures:   active listening utilized   counseling provided       0902      DATA:     Therapist met with the patient individually. Therapist continues reviewing plan of care and aftercare plan.  The patient was agreeable.     ASSESSMENT:     Patient was seen for follow up of Alcohol withdrawal with delirium. Benzodiazepine abuse with withdrawal symptoms. Opioid dependency. Suicidal ideation      Today, patient was seen 1-1 in the office.  Patient noted to be calm, cooperative, oriented. Patient noted to have restricted affect, congruent mood, coherent thought content. Patient is some what restless during session. Therapist was present during Dr. Lowe's evaluation and 1-1 with the patient. Patient was at first focused on leaving and admits that he is anxious about his job and missing work. Therapist and Dr. Lowe provided emotional support and patient signed consent for this therapist to speak with manager at [employer].  Contacted manager Douglas at [phone]; she verbalized support for patient and identifies that his job is safe and will there when he returns.   Patient thanked therapist and expressed relief this date.       CLINICAL MANEUVERING:     Therapist provided safe, secure environment for patient to share.  Provided reflective listening and psychoeducation.  Assisted patient in processing the above session. Assisted patient in identifying any questions or needs to be addressed today.         Concern was voiced regarding substance use and educated patient on risks associated with use. Patient was advised to abstain from use and educated on community resources that can help with sobriety and recovery. Patient signed consent to return to [facility].         Plan:       Patient to remain hospitalized this date.      Treatment team will focus efforts on stabilizing patient's acute symptoms while providing education on healthy coping and crisis management to reduce hospitalizations.   Patient requires daily psychiatrist evaluation and 24/7 nursing supervision to promote patient  safety.     Therapist will offer 1-4 individual sessions, family education, and appropriate referral.     Therapist recommends continued assessment.  Patient to return to [facility] at discharge. Their agency will transport.

## 2024-01-24 NOTE — PROGRESS NOTES
"INPATIENT PSYCHIATRIC PROGRESS NOTE    Name:  Van Heard  :  1990  MRN:  [id number]  Visit Number:  [id number]  Length of stay:  3    Behavioral Health Treatment Plan and Problem List: I have reviewed and approved the Behavioral Health Treatment Plan and Problem list.    SUBJECTIVE    CC/ Focus of exam:   depression/ delirium/ substance abuse (primarily alcohol)     Patient's subjective status: \"doing better\"    INTERVAL HISTORY: one instance of auditory hallucinations yesterday hearing his named called out and thinking someone was \"talking about me\". Was brief and not associated with agitation. Clear this morning and optimistic about returning to his job and recovery effort. Resting tremor resolved. Plan for likely discharge tomorrow morning.     Depression rating 2/10  Anxiety rating 5/10  Hopelessness: 0/10  Sleep:slept well  Withdrawal sx: none  Craving: says not       ROS: No cardiovascular, GI, or Neurological complaints.       OBJECTIVE    Vitals:    24 0731   BP: 107/58   Pulse: 61   Resp: 18   Temp: 97.1 °F (36.2 °C)   SpO2: 96%      Temp:  [97.1 °F (36.2 °C)-97.7 °F (36.5 °C)] 97.1 °F (36.2 °C)  Heart Rate:  [] 61  Resp:  [16-18] 18  BP: (102-122)/(58-84) 107/58    MENTAL STATUS EXAM:       Psychomotor: No psychomotor agitation/retardation, No EPS, No motor tics  Speech-normal rate, amount.  Mood/Affect: Appropriate  Thought Processes: linear, logical, and goal directed  Thought Content: rational  Hallucination(s): auditory  Hopelessness: No  Optimistic: yes  Suicidal Thoughts: No  Suicidal Plan/Intent: No  Homicidal Thoughts: absent  Orientation: oriented x 3  Memory: recent intact    Lab Results (last 24 hours)       Procedure Component Value Units Date/Time    Comprehensive Metabolic Panel [[id number]]  (Abnormal) Collected: 24 0500    Specimen: Blood Updated: 24 0546     Glucose 112 mg/dL      BUN 15 mg/dL      Creatinine 1.16 mg/dL      Sodium 142 mmol/L      Potassium " 3.9 mmol/L      Chloride 105 mmol/L      CO2 30.3 mmol/L      Calcium 9.2 mg/dL      Total Protein 6.1 g/dL      Albumin 3.7 g/dL      ALT (SGPT) 9 U/L      AST (SGOT) 15 U/L      Alkaline Phosphatase 47 U/L      Total Bilirubin <0.2 mg/dL      Globulin 2.4 gm/dL      A/G Ratio 1.5 g/dL      BUN/Creatinine Ratio 12.9     Anion Gap 6.7 mmol/L      eGFR 83.7 mL/min/1.73     Narrative:      GFR Normal >60  Chronic Kidney Disease <60  Kidney Failure <15               Imaging Results (Last 24 Hours)       ** No results found for the last 24 hours. **             Lab and x-ray studies reviewed.    ECG/EMG Results (most recent)       Procedure Component Value Units Date/Time    ECG 12 Lead Other; Baseline Cardiac Status [[id number]] Collected: 01/21/24 1721     Updated: 01/23/24 1531     QT Interval 384 ms      QTC Interval 462 ms     Narrative:      Test Reason : Other~  Blood Pressure :   */*   mmHG  Vent. Rate :  87 BPM     Atrial Rate :  87 BPM     P-R Int : 130 ms          QRS Dur : 120 ms      QT Int : 384 ms       P-R-T Axes :  72  27  80 degrees     QTc Int : 462 ms    Normal sinus rhythm  Right bundle branch block  Abnormal ECG  When compared with ECG of 22-MAR-2018 02:08,  Vent. rate has increased BY  36 BPM  Right bundle branch block has replaced Nonspecific intraventricular conduction delay  Confirmed by Jerry Cobb (2001) on 1/23/2024 12:59:35 PM  Also confirmed by Jerry Cobb (2001)  on 1/23/2024 3:28:23 PM    Referred By: MARGY           Confirmed By: Jerry Cobb    ECG 12 Lead Chest Pain [[id number]] Collected: 01/21/24 2010     Updated: 01/23/24 1533     QT Interval 370 ms      QTC Interval 467 ms     Narrative:      Test Reason : Chest Pain  Blood Pressure :   */*   mmHG  Vent. Rate :  96 BPM     Atrial Rate :  96 BPM     P-R Int : 134 ms          QRS Dur : 116 ms      QT Int : 370 ms       P-R-T Axes :  73  24  72 degrees     QTc Int : 467 ms    Normal sinus rhythm  Incomplete right bundle branch  block  Borderline ECG  When compared with ECG of 21-JAN-2024 17:21, (Unconfirmed)  Incomplete right bundle branch block has replaced Right bundle branch block  Confirmed by Jerry Cobb (2001) on 1/23/2024 1:11:24 PM  Also confirmed by Jerry Cobb (2001)  on 1/23/2024 3:28:35 PM    Referred By:            Confirmed By: Jerry Cobb             ALLERGIES: Patient has no known allergies.    Medications:     LORazepam, 1 mg, Oral, 3 times per day   Followed by  [START ON 1/25/2024] LORazepam, 0.5 mg, Oral, 3 times per day  nicotine, 1 patch, Transdermal, Q24H  [START ON 1/25/2024] OLANZapine zydis, 5 mg, Oral, Daily       ASSESSMENT & PLAN    Alcohol withdrawal with delirium  Ativan detox protocol plus Zyprexa, will be endorsing is continuing involvement with chemical dependency treatment programs post hospital.  Individual groups psychotherapeutic effort. Reducing the Zyprexa today.      Benzodiazepine abuse with withdrawal symptoms  Ativan detox protocol. Last day of detox today.      Opioid dependency  Probably will not require detox given his brief but serious abuse including fentanyl abuse PTA     Suicidal ideation  Precautions         Special precautions: Special Precautions Level 3 (q15 min checks)     Behavioral Health Treatment Plan and Problem List: I have reviewed and approved the Behavioral Health Treatment Plan and Problem list.    I spent a total of 26 minutes in direct patient care including  17 minutes face to face with the patient assessment, coordination of care, and counseling the patient on the current and follow-up treatment plans regarding his status and situation.  Patient had no additional questions.     HEMA Lowe MD    Clinician:  Hai Lowe MD  01/24/24  07:59 EST    Dictated utilizing Dragon dictation

## 2024-01-24 NOTE — PLAN OF CARE
Goal Outcome Evaluation:  Plan of Care Reviewed With: patient  Patient Agreement with Plan of Care: agrees     Progress: improving  Outcome Evaluation: Patient cooperative. Patient states eating and sleeping well. Reports anxiety 3/10 and depression 1/10. Denies SI/HI/AVH. Denies cravings.

## 2024-01-25 VITALS
HEIGHT: 72 IN | WEIGHT: 148.3 LBS | HEART RATE: 70 BPM | OXYGEN SATURATION: 98 % | BODY MASS INDEX: 20.09 KG/M2 | SYSTOLIC BLOOD PRESSURE: 108 MMHG | DIASTOLIC BLOOD PRESSURE: 58 MMHG | TEMPERATURE: 97.4 F | RESPIRATION RATE: 16 BRPM

## 2024-01-25 PROBLEM — F15.10 METHAMPHETAMINE ABUSE: Status: ACTIVE | Noted: 2024-01-25

## 2024-01-25 PROBLEM — F33.8 OTHER RECURRENT DEPRESSIVE DISORDERS: Status: ACTIVE | Noted: 2024-01-25

## 2024-01-25 PROBLEM — F13.10 BENZODIAZEPINE ABUSE: Status: ACTIVE | Noted: 2024-01-25

## 2024-01-25 PROBLEM — R45.851 SUICIDAL IDEATION: Status: RESOLVED | Noted: 2024-01-21 | Resolved: 2024-01-25

## 2024-01-25 PROCEDURE — 99239 HOSP IP/OBS DSCHRG MGMT >30: CPT | Performed by: PSYCHIATRY & NEUROLOGY

## 2024-01-25 RX ORDER — MIRTAZAPINE 15 MG/1
15 TABLET, FILM COATED ORAL NIGHTLY
Qty: 30 TABLET | Refills: 0 | Status: SHIPPED | OUTPATIENT
Start: 2024-01-25 | End: 2025-01-24

## 2024-01-25 RX ADMIN — NICOTINE TRANSDERMAL SYSTEM 1 PATCH: 21 PATCH, EXTENDED RELEASE TRANSDERMAL at 08:44

## 2024-01-25 RX ADMIN — OLANZAPINE 5 MG: 5 TABLET, ORALLY DISINTEGRATING ORAL at 08:44

## 2024-01-25 RX ADMIN — LORAZEPAM 0.5 MG: 0.5 TABLET ORAL at 08:44

## 2024-01-25 NOTE — PROGRESS NOTES
0846:     Therapist met 1-1 in the office. Patient calm/cooperative, oriented x 4.  Patient noted to have appropriate affect, congruent mood, normal thought content. Patient denies SI/HI/AVH and acute symptoms.  Patient rates depression/anxiety/cravings at 4/10.  Patient denies side affects and acute withdrawal.  Patient is future oriented. He reports that he wants to return to sober living and to his job at [email or web address].  Therapist has involved his manager and they were aware of his hospitalization and supportive.      Concern was voiced regarding substance use and educated patient on risks associated with use. Patient was advised to abstain from use and educated on community resources that can help with sobriety and recovery.  Patient to return to [facility] this date.      Patient declines family or friend involvement.      He has been encouraged to utilize his sponsor Edward, to attend support meetings and to use CBT and DBT concepts that he has learned about in group. Patient receptive.     Assisted patient in identifying risk factors which would indicate the need for higher level of care including thoughts to harm self or others and/or self-harming behavior and encouraged patient to call [phone], call 911, or present to the nearest emergency room should any of these events occur. Discussed crisis intervention services and means to access.  Patient adamantly and convincingly denies current suicidal or homicidal ideation or perceptual disturbance.    Therapist completed the following safety plan with the patient       SAFETY/MENTAL HEALTH PLAN    1. Recognizing warning signs: Warning signs that a crisis may be developing such as thoughts, images, mood, situation, behaviors:       Thoughts of relapsing. If I have relapsed. Suicidal thoughts     2. Internal coping strategies: Things that the patient can do to take their mind off problems without contacting another person such as relaxation techniques, physical  activity, etc:    Exercise, take medicine      3. Socialization strategies for distraction and support: People and social settings that provide distraction or support - names or places, telephone:        Meetings at [facility]     4. Social contact for assistance in resolving crisis: People the patient can ask for help - names and telephone:    Sponsor Edward [phone]    5. Professionals or agencies contacts to help resolve crisis: Professionals or agencies the patient can contact during a crisis - clinician name/location/phone/emergency contact number, local urgent care services with address/phone, National Suicide Prevention Lifeline (798), Emergency contact 911:        646, or have staff bring me here     6. Means restriction: Ways to make the environment safe     No weapons, firearms.  Patient returning to secure sober living.

## 2024-01-25 NOTE — PROGRESS NOTES
Behavioral Health Discharge Summary             Please fax within 24 hours of discharge to [facility] at: [phone]      Member Name: Van Heard Member ID: [id number]   Authorization Number: [id number] Phone: [phone]   Member Address: [address] [address]   Discharge Date: 01/25/2024 Level of Care at Discharge:    Facility: [facility] Staff Completing Form: Nicole ROBERTSON   If the member is being discharged directly to a residential or extended care program, please specify the type below.   __Private Child-Caring Facility (PCC) Residential/Group Home   __Private Child-Caring Facility (PCC) Therapeutic Foster Care   __Residential Treatment Facility (RTF)   __Psychiatric Residential Treatment Facility (PRTF I or II)   __Long-Term Acute Inpatient Hospital Services or Extended Care Unit (ECU)   __Other (please specify):    Brief discharge summary of treatment received (for follow up by the case management team): D/C clinical with list of medications and follow up appts given to patient upon discharge.     BRIEF SUMMARY OF RECOMMENDATIONS FOR ONGOING TREATMENT     Discharged to where: [facility] [place]   Discharge diagnoses: F 33.8   Axis I:    Axis II:    Axis III:    Axis IV:    Axis V:    Does the member understand his/her DX?  Yes          Medication     Dose     Schedule Supply/  Quantity  Given at Discharge RX Provided  Yes/No  If Rx Provided, Quantity RX Prior Auth Required  Yes/No Prior Auth  Completed   Remeron  15 mg  1 tab PO @ night                                                                                         Does the member understand the reason for taking these medications? Yes                                                           FOLLOW-UP APPOINTMENTS   Please schedule within 7 days of discharge and provide appointment details for all referred services.    PCP/Other Providers Involved in Treatment:     Appointment Type: IP REHAB  Provider Name:   Karol Vázquez  Provider Phone:   [phone] Appointment Date: 01/25/2024 Appointment Time:      Assessment   (new to OP services)        Case Management    Is the member already enrolled in case management?  Yes/No  If yes, date the CM was notified:    If no, was the CM referral offered?  Yes/No  Accepted? Yes/No    Is the Release of Information in the chart? Yes/No:      Medication Management (for member discharged with psychiatric medications):      A&D Treatment (for member with substance abuse/   dependence in the past year):      Medical Condition (for member with a medical condition):    Other recommended treatment:    Do you have any concerns about the discharge plan?  No    If yes, explain:    Was the member involved in the discharge planning?  Yes    If no, explain:    Was a copy of the discharge plan provided to the member?  Yes    If no, explain:

## 2024-01-25 NOTE — DISCHARGE SUMMARY
Date of Discharge:  1/25/2024    Discharge Diagnosis:Principal Problem:    Other recurrent depressive disorders  Active Problems:    Alcohol dependence with withdrawal    DTs (delirium tremens)    Methamphetamine abuse    Benzodiazepine abuse        Presenting Problem/History of Present Illness:Patient was admitted to the hospital on January 21 having presented depressed and agitated voicing suicidal intent, voluntarily admitted for safety evaluation and treatment, see admission note for details.         Hospital Course:      Patient was admitted for safety and stabilization and was placed on standard precautions.  Routine labs were checked.  Patient was assigned a master's level therapist and provided with an opportunity to participate in group and individual therapy on the unit.  Patient seen on a daily basis for evaluation and supportive therapy.  Patient placed on a lorazepam detox protocol with multivitamins and thiamine and proceeded through the detox process without occurrence of major withdrawal consequences.  Patient had been experiencing some auditory and visual hallucinations and associated paranoia initially.  These cleared as did his depression and at discharge patient free of any thoughts of harming self or others, free of any psychotic symptomatology and voicing optimism regarding his recovery program plans.  Patient anticipated returning to his job at the [employer] day following discharge while staying at the [facility] treatment program.  Discharge medications limited to mirtazapine 15 mg at bedtime.  See below for further details.    Consults:   Consults       No orders found from 12/23/2023 to 1/22/2024.            Labs:  Lab Results (all)       Procedure Component Value Units Date/Time    Comprehensive Metabolic Panel [[id number]]  (Abnormal) Collected: 01/24/24 0500    Specimen: Blood Updated: 01/24/24 0546     Glucose 112 mg/dL      BUN 15 mg/dL       Creatinine 1.16 mg/dL      Sodium 142 mmol/L      Potassium 3.9 mmol/L      Chloride 105 mmol/L      CO2 30.3 mmol/L      Calcium 9.2 mg/dL      Total Protein 6.1 g/dL      Albumin 3.7 g/dL      ALT (SGPT) 9 U/L      AST (SGOT) 15 U/L      Alkaline Phosphatase 47 U/L      Total Bilirubin <0.2 mg/dL      Globulin 2.4 gm/dL      A/G Ratio 1.5 g/dL      BUN/Creatinine Ratio 12.9     Anion Gap 6.7 mmol/L      eGFR 83.7 mL/min/1.73     Narrative:      GFR Normal >60  Chronic Kidney Disease <60  Kidney Failure <15      High Sensitivity Troponin T 2Hr [[id number]] Collected: 01/21/24 2138    Specimen: Blood Updated: 01/21/24 2235     HS Troponin T 7 ng/L      Troponin T Delta --     Comment: Unable to calculate.       Narrative:      High Sensitive Troponin T Reference Range:  <14.0 ng/L- Negative Female for AMI  <22.0 ng/L- Negative Male for AMI  >=14 - Abnormal Female indicating possible myocardial injury.  >=22 - Abnormal Male indicating possible myocardial injury.   Clinicians would have to utilize clinical acumen, EKG, Troponin, and serial changes to determine if it is an Acute Myocardial Infarction or myocardial injury due to an underlying chronic condition.         Hepatitis Panel, Acute [[id number]]  (Abnormal) Collected: 01/21/24 1958    Specimen: Blood Updated: 01/21/24 2105     Hepatitis B Surface Ag Non-Reactive     Hep A IgM Non-Reactive     Hep B C IgM Non-Reactive     Hepatitis C Ab Reactive    Narrative:      Results may be falsely decreased if patient taking Biotin.     High Sensitivity Troponin T [[id number]]  (Normal) Collected: 01/21/24 1958    Specimen: Blood Updated: 01/21/24 2029     HS Troponin T <6 ng/L     Narrative:      High Sensitive Troponin T Reference Range:  <14.0 ng/L- Negative Female for AMI  <22.0 ng/L- Negative Male for AMI  >=14 - Abnormal Female indicating possible myocardial injury.  >=22 - Abnormal Male indicating possible myocardial injury.   Clinicians would have to utilize clinical  acumen, EKG, Troponin, and serial changes to determine if it is an Acute Myocardial Infarction or myocardial injury due to an underlying chronic condition.                 Imaging:  Imaging Results (All)       None            Condition on Discharge:  improved    Prognosis: Fair    Vital Signs  Temp:  [97.4 °F (36.3 °C)-97.8 °F (36.6 °C)] 97.4 °F (36.3 °C)  Heart Rate:  [70-86] 70  Resp:  [16-18] 16  BP: (100-139)/(57-82) 108/58    Discharge Disposition         Discharge Medications        ASK your doctor about these medications        Instructions Start Date   buPROPion  MG 24 hr tablet  Commonly known as: WELLBUTRIN XL   150 mg, Oral, Daily      melatonin 5 MG tablet tablet   5 mg, Oral, Nightly PRN               Discharge Diet: Regular    Activity at Discharge: No restrictions    Follow-up Appointments:    Patient returning to the [facility] treatment program date of discharge, they provide individual counseling and align patient with PCP follow-up as needed for medication.        Hai Lowe MD  01/25/24  08:36 EST  .    Time: I spent greater than 30 minutes on this discharge activity which included: face-to-face encounter with the patient, reviewing the data in the system, coordination of the care with the nursing staff as well as consultants, documentation, and entering orders.      Dictated utilizing Dragon dictation

## 2024-01-25 NOTE — PLAN OF CARE
Goal Outcome Evaluation:  Plan of Care Reviewed With: patient  Patient Agreement with Plan of Care: agrees     Progress: improving  Outcome Evaluation: Patient calm and cooperative. Patient denies any anxiety, depression, SI/HI or AVH. Patient denies any withdrawal symptoms or cravings. Patient is being discharged to [facility] today.

## 2024-01-25 NOTE — PLAN OF CARE
Goal Outcome Evaluation:  Plan of Care Reviewed With: patient  Patient Agreement with Plan of Care: agrees     Progress: improving  Outcome Evaluation: Patient has been calm and cooperative during this shift. He spends most of his free time in the day room this evening, watching TV. Patient denies depression. He rates anxiety 1. He denies SI, HI and AVH. Patient denies withdrawal symptoms but appears to be restless and figity at times.

## 2024-05-28 ENCOUNTER — HOSPITAL ENCOUNTER (INPATIENT)
Facility: HOSPITAL | Age: 34
LOS: 6 days | Discharge: HOME | DRG: 885 | End: 2024-06-03
Attending: PSYCHIATRY & NEUROLOGY | Admitting: PSYCHIATRY & NEUROLOGY
Payer: COMMERCIAL

## 2024-05-28 ENCOUNTER — HOSPITAL ENCOUNTER (EMERGENCY)
Facility: HOSPITAL | Age: 34
Discharge: PSYCHIATRIC OR SUBSTANCE USE FACILITY | DRG: 885 | End: 2024-05-28
Attending: EMERGENCY MEDICINE | Admitting: EMERGENCY MEDICINE
Payer: COMMERCIAL

## 2024-05-28 VITALS
HEART RATE: 85 BPM | BODY MASS INDEX: 19.64 KG/M2 | RESPIRATION RATE: 20 BRPM | DIASTOLIC BLOOD PRESSURE: 80 MMHG | WEIGHT: 145 LBS | TEMPERATURE: 97.5 F | SYSTOLIC BLOOD PRESSURE: 122 MMHG | OXYGEN SATURATION: 97 % | HEIGHT: 72 IN

## 2024-05-28 DIAGNOSIS — F10.10 ALCOHOL ABUSE: ICD-10-CM

## 2024-05-28 DIAGNOSIS — F19.10 SUBSTANCE ABUSE: Primary | ICD-10-CM

## 2024-05-28 PROBLEM — R45.851 SUICIDAL IDEATION: Status: ACTIVE | Noted: 2024-05-28

## 2024-05-28 LAB
ALBUMIN SERPL-MCNC: 4.7 G/DL (ref 3.5–5.2)
ALBUMIN/GLOB SERPL: 1.6 G/DL
ALP SERPL-CCNC: 63 U/L (ref 39–117)
ALT SERPL W P-5'-P-CCNC: 13 U/L (ref 1–41)
AMPHET+METHAMPHET UR QL: POSITIVE
AMPHETAMINES UR QL: POSITIVE
ANION GAP SERPL CALCULATED.3IONS-SCNC: 12 MMOL/L (ref 5–15)
AST SERPL-CCNC: 19 U/L (ref 1–40)
BACTERIA UR QL AUTO: NORMAL /HPF
BARBITURATES UR QL SCN: NEGATIVE
BASOPHILS # BLD AUTO: 0.09 10*3/MM3 (ref 0–0.2)
BASOPHILS NFR BLD AUTO: 1.5 % (ref 0–1.5)
BENZODIAZ UR QL SCN: POSITIVE
BILIRUB SERPL-MCNC: 0.4 MG/DL (ref 0–1.2)
BILIRUB UR QL STRIP: NEGATIVE
BUN SERPL-MCNC: 11 MG/DL (ref 6–20)
BUN/CREAT SERPL: 12 (ref 7–25)
BUPRENORPHINE SERPL-MCNC: POSITIVE NG/ML
CALCIUM SPEC-SCNC: 9.7 MG/DL (ref 8.6–10.5)
CANNABINOIDS SERPL QL: POSITIVE
CHLORIDE SERPL-SCNC: 100 MMOL/L (ref 98–107)
CLARITY UR: CLEAR
CO2 SERPL-SCNC: 27 MMOL/L (ref 22–29)
COCAINE UR QL: NEGATIVE
COLOR UR: YELLOW
CREAT SERPL-MCNC: 0.92 MG/DL (ref 0.76–1.27)
DEPRECATED RDW RBC AUTO: 40.8 FL (ref 37–54)
EGFRCR SERPLBLD CKD-EPI 2021: 110.6 ML/MIN/1.73
EOSINOPHIL # BLD AUTO: 0.28 10*3/MM3 (ref 0–0.4)
EOSINOPHIL NFR BLD AUTO: 4.5 % (ref 0.3–6.2)
ERYTHROCYTE [DISTWIDTH] IN BLOOD BY AUTOMATED COUNT: 12.6 % (ref 12.3–15.4)
ETHANOL BLD-MCNC: 64 MG/DL (ref 0–10)
ETHANOL UR QL: 0.06 %
FENTANYL UR-MCNC: NEGATIVE NG/ML
GLOBULIN UR ELPH-MCNC: 2.9 GM/DL
GLUCOSE SERPL-MCNC: 95 MG/DL (ref 65–99)
GLUCOSE UR STRIP-MCNC: NEGATIVE MG/DL
HAV IGM SERPL QL IA: ABNORMAL
HBV CORE IGM SERPL QL IA: ABNORMAL
HBV SURFACE AG SERPL QL IA: ABNORMAL
HCT VFR BLD AUTO: 43.9 % (ref 37.5–51)
HCV AB SER QL: REACTIVE
HGB BLD-MCNC: 14.5 G/DL (ref 13–17.7)
HGB UR QL STRIP.AUTO: NEGATIVE
HYALINE CASTS UR QL AUTO: NORMAL /LPF
IMM GRANULOCYTES # BLD AUTO: 0.01 10*3/MM3 (ref 0–0.05)
IMM GRANULOCYTES NFR BLD AUTO: 0.2 % (ref 0–0.5)
KETONES UR QL STRIP: NEGATIVE
LEUKOCYTE ESTERASE UR QL STRIP.AUTO: ABNORMAL
LYMPHOCYTES # BLD AUTO: 1.46 10*3/MM3 (ref 0.7–3.1)
LYMPHOCYTES NFR BLD AUTO: 23.7 % (ref 19.6–45.3)
MAGNESIUM SERPL-MCNC: 2.2 MG/DL (ref 1.6–2.6)
MCH RBC QN AUTO: 29.3 PG (ref 26.6–33)
MCHC RBC AUTO-ENTMCNC: 33 G/DL (ref 31.5–35.7)
MCV RBC AUTO: 88.7 FL (ref 79–97)
METHADONE UR QL SCN: NEGATIVE
MONOCYTES # BLD AUTO: 0.48 10*3/MM3 (ref 0.1–0.9)
MONOCYTES NFR BLD AUTO: 7.8 % (ref 5–12)
NEUTROPHILS NFR BLD AUTO: 3.85 10*3/MM3 (ref 1.7–7)
NEUTROPHILS NFR BLD AUTO: 62.3 % (ref 42.7–76)
NITRITE UR QL STRIP: NEGATIVE
NRBC BLD AUTO-RTO: 0 /100 WBC (ref 0–0.2)
OPIATES UR QL: NEGATIVE
OXYCODONE UR QL SCN: NEGATIVE
PCP UR QL SCN: NEGATIVE
PH UR STRIP.AUTO: 6.5 [PH] (ref 5–8)
PLATELET # BLD AUTO: 285 10*3/MM3 (ref 140–450)
PMV BLD AUTO: 10.3 FL (ref 6–12)
POTASSIUM SERPL-SCNC: 3.5 MMOL/L (ref 3.5–5.2)
PROT SERPL-MCNC: 7.6 G/DL (ref 6–8.5)
PROT UR QL STRIP: NEGATIVE
RBC # BLD AUTO: 4.95 10*6/MM3 (ref 4.14–5.8)
RBC # UR STRIP: NORMAL /HPF
REF LAB TEST METHOD: NORMAL
SODIUM SERPL-SCNC: 139 MMOL/L (ref 136–145)
SP GR UR STRIP: 1.01 (ref 1–1.03)
SQUAMOUS #/AREA URNS HPF: NORMAL /HPF
TRICYCLICS UR QL SCN: NEGATIVE
UROBILINOGEN UR QL STRIP: ABNORMAL
WBC # UR STRIP: NORMAL /HPF
WBC NRBC COR # BLD AUTO: 6.17 10*3/MM3 (ref 3.4–10.8)

## 2024-05-28 PROCEDURE — 80053 COMPREHEN METABOLIC PANEL: CPT | Performed by: PHYSICIAN ASSISTANT

## 2024-05-28 PROCEDURE — 93010 ELECTROCARDIOGRAM REPORT: CPT | Performed by: SPECIALIST

## 2024-05-28 PROCEDURE — 83735 ASSAY OF MAGNESIUM: CPT | Performed by: PHYSICIAN ASSISTANT

## 2024-05-28 PROCEDURE — 85025 COMPLETE CBC W/AUTO DIFF WBC: CPT | Performed by: PHYSICIAN ASSISTANT

## 2024-05-28 PROCEDURE — HZ2ZZZZ DETOXIFICATION SERVICES FOR SUBSTANCE ABUSE TREATMENT: ICD-10-PCS | Performed by: PSYCHIATRY & NEUROLOGY

## 2024-05-28 PROCEDURE — 80074 ACUTE HEPATITIS PANEL: CPT | Performed by: PSYCHIATRY & NEUROLOGY

## 2024-05-28 PROCEDURE — 81001 URINALYSIS AUTO W/SCOPE: CPT | Performed by: PHYSICIAN ASSISTANT

## 2024-05-28 PROCEDURE — 36415 COLL VENOUS BLD VENIPUNCTURE: CPT

## 2024-05-28 PROCEDURE — 93005 ELECTROCARDIOGRAM TRACING: CPT | Performed by: PSYCHIATRY & NEUROLOGY

## 2024-05-28 PROCEDURE — 82077 ASSAY SPEC XCP UR&BREATH IA: CPT | Performed by: PHYSICIAN ASSISTANT

## 2024-05-28 PROCEDURE — 80307 DRUG TEST PRSMV CHEM ANLYZR: CPT | Performed by: PHYSICIAN ASSISTANT

## 2024-05-28 PROCEDURE — 99285 EMERGENCY DEPT VISIT HI MDM: CPT

## 2024-05-28 RX ORDER — IBUPROFEN 400 MG/1
400 TABLET ORAL EVERY 6 HOURS PRN
Status: DISCONTINUED | OUTPATIENT
Start: 2024-05-28 | End: 2024-06-03 | Stop reason: HOSPADM

## 2024-05-28 RX ORDER — ECHINACEA PURPUREA EXTRACT 125 MG
2 TABLET ORAL AS NEEDED
Status: DISCONTINUED | OUTPATIENT
Start: 2024-05-28 | End: 2024-06-03 | Stop reason: HOSPADM

## 2024-05-28 RX ORDER — ACETAMINOPHEN 325 MG/1
650 TABLET ORAL EVERY 6 HOURS PRN
Status: DISCONTINUED | OUTPATIENT
Start: 2024-05-28 | End: 2024-06-03 | Stop reason: HOSPADM

## 2024-05-28 RX ORDER — BENZTROPINE MESYLATE 1 MG/1
2 TABLET ORAL ONCE AS NEEDED
Status: DISCONTINUED | OUTPATIENT
Start: 2024-05-28 | End: 2024-05-29

## 2024-05-28 RX ORDER — BENZONATATE 100 MG/1
100 CAPSULE ORAL 3 TIMES DAILY PRN
Status: DISCONTINUED | OUTPATIENT
Start: 2024-05-28 | End: 2024-06-03 | Stop reason: HOSPADM

## 2024-05-28 RX ORDER — FAMOTIDINE 20 MG/1
20 TABLET, FILM COATED ORAL 2 TIMES DAILY PRN
Status: DISCONTINUED | OUTPATIENT
Start: 2024-05-28 | End: 2024-06-03 | Stop reason: HOSPADM

## 2024-05-28 RX ORDER — NICOTINE 21 MG/24HR
1 PATCH, TRANSDERMAL 24 HOURS TRANSDERMAL
Status: DISCONTINUED | OUTPATIENT
Start: 2024-05-28 | End: 2024-06-03 | Stop reason: HOSPADM

## 2024-05-28 RX ORDER — LOPERAMIDE HYDROCHLORIDE 2 MG/1
2 CAPSULE ORAL
Status: DISCONTINUED | OUTPATIENT
Start: 2024-05-28 | End: 2024-06-03 | Stop reason: HOSPADM

## 2024-05-28 RX ORDER — MULTIVITAMIN WITH IRON
2 TABLET ORAL DAILY
Status: DISCONTINUED | OUTPATIENT
Start: 2024-05-28 | End: 2024-06-03 | Stop reason: HOSPADM

## 2024-05-28 RX ORDER — ALUMINA, MAGNESIA, AND SIMETHICONE 2400; 2400; 240 MG/30ML; MG/30ML; MG/30ML
15 SUSPENSION ORAL EVERY 6 HOURS PRN
Status: DISCONTINUED | OUTPATIENT
Start: 2024-05-28 | End: 2024-06-03 | Stop reason: HOSPADM

## 2024-05-28 RX ORDER — ONDANSETRON 4 MG/1
4 TABLET, ORALLY DISINTEGRATING ORAL EVERY 6 HOURS PRN
Status: DISCONTINUED | OUTPATIENT
Start: 2024-05-28 | End: 2024-06-03 | Stop reason: HOSPADM

## 2024-05-28 RX ORDER — MULTIPLE VITAMINS W/ MINERALS TAB 9MG-400MCG
1 TAB ORAL DAILY
Status: DISCONTINUED | OUTPATIENT
Start: 2024-05-28 | End: 2024-06-03 | Stop reason: HOSPADM

## 2024-05-28 RX ORDER — BENZTROPINE MESYLATE 1 MG/ML
1 INJECTION INTRAMUSCULAR; INTRAVENOUS ONCE AS NEEDED
Status: DISCONTINUED | OUTPATIENT
Start: 2024-05-28 | End: 2024-05-29

## 2024-05-28 RX ORDER — TRAZODONE HYDROCHLORIDE 50 MG/1
50 TABLET ORAL NIGHTLY PRN
Status: DISCONTINUED | OUTPATIENT
Start: 2024-05-28 | End: 2024-06-03 | Stop reason: HOSPADM

## 2024-05-28 RX ORDER — HYDROXYZINE 50 MG/1
50 TABLET, FILM COATED ORAL EVERY 6 HOURS PRN
Status: DISCONTINUED | OUTPATIENT
Start: 2024-05-28 | End: 2024-06-03 | Stop reason: HOSPADM

## 2024-05-28 RX ADMIN — HYDROXYZINE HYDROCHLORIDE 50 MG: 50 TABLET, FILM COATED ORAL at 21:20

## 2024-05-28 RX ADMIN — Medication 100 MG: at 12:53

## 2024-05-28 RX ADMIN — ACETAMINOPHEN 650 MG: 325 TABLET ORAL at 21:20

## 2024-05-28 RX ADMIN — Medication 1 TABLET: at 12:53

## 2024-05-28 RX ADMIN — Medication 2 TABLET: at 12:53

## 2024-05-28 RX ADMIN — NICOTINE TRANSDERMAL SYSTEM 1 PATCH: 21 PATCH, EXTENDED RELEASE TRANSDERMAL at 12:53

## 2024-05-28 NOTE — NURSING NOTE
"Patient presents reporting suicidal ideation with a plan to overdose on fentanyl. He also is requesting detox. He reports drinking a fifth a day for the past 2 months with his last use being earlier this AM. He reports snoring 1/2g of meth a day for the past 5 years with his last use being this AM. He reports taking 2 vallium a day to help him stop drinking for the past month with his last use being today. Patient also reports taking \"some adderall\" 2-3 days ago. He rates anxiety 10/10 and depression 8/10. He denies any cravings. [place] 10.   "

## 2024-05-28 NOTE — ED PROVIDER NOTES
"Subjective   History of Present Illness  34-year-old male presents secondary to need for detox from methamphetamine and drugs.  Patient states he uses methamphetamine and drinks alcohol excessively.  He denies any homicidal ideation however he has had thoughts of killing himself.  He states he had thoughts of running his car into a tree.  He has a past medical history of anxiety/depression along with ADHD and substance abuse.  He presents by private vehicle.      Review of Systems   Constitutional: Negative.  Negative for fever.   HENT: Negative.     Respiratory: Negative.     Cardiovascular: Negative.  Negative for chest pain.   Gastrointestinal: Negative.  Negative for abdominal pain.   Endocrine: Negative.    Genitourinary: Negative.  Negative for dysuria.   Skin: Negative.    Neurological: Negative.    Psychiatric/Behavioral: Negative.     All other systems reviewed and are negative.      Past Medical History:   Diagnosis Date    ADHD (attention deficit hyperactivity disorder)     childhood    Alcohol abuse     Anxiety     Depression     Substance abuse     Suicide attempt     \"I slit my wrist.\" 2009    Withdrawal symptoms, drug or narcotic        No Known Allergies    Past Surgical History:   Procedure Laterality Date    HERNIA REPAIR  2002    WISDOM TOOTH EXTRACTION  2009       Family History   Problem Relation Age of Onset    Alcohol abuse Mother     Alcohol abuse Father     Drug abuse Father        Social History     Socioeconomic History    Marital status: Single    Number of children: 0    Highest education level: Some college, no degree   Tobacco Use    Smoking status: Every Day     Current packs/day: 0.25     Types: Cigarettes    Smokeless tobacco: Never   Vaping Use    Vaping status: Some Days    Substances: Nicotine, Flavoring   Substance and Sexual Activity    Alcohol use: Yes     Comment: See below    Drug use: Yes     Types: Methamphetamines, Benzodiazepines     Comment: suboxone, etoh    Sexual " activity: Not Currently     Partners: Female           Objective   Physical Exam  Vitals and nursing note reviewed.   Constitutional:       General: He is not in acute distress.     Appearance: He is well-developed. He is not diaphoretic.   HENT:      Head: Normocephalic and atraumatic.      Right Ear: External ear normal.      Left Ear: External ear normal.      Nose: Nose normal.   Eyes:      Conjunctiva/sclera: Conjunctivae normal.      Pupils: Pupils are equal, round, and reactive to light.   Neck:      Vascular: No JVD.      Trachea: No tracheal deviation.   Cardiovascular:      Rate and Rhythm: Normal rate and regular rhythm.      Heart sounds: Normal heart sounds. No murmur heard.  Pulmonary:      Effort: Pulmonary effort is normal. No respiratory distress.      Breath sounds: Normal breath sounds. No wheezing.   Abdominal:      General: Bowel sounds are normal.      Palpations: Abdomen is soft.      Tenderness: There is no abdominal tenderness.   Musculoskeletal:         General: No deformity. Normal range of motion.      Cervical back: Normal range of motion and neck supple.   Skin:     General: Skin is warm and dry.      Coloration: Skin is not pale.      Findings: No erythema or rash.   Neurological:      Mental Status: He is alert and oriented to person, place, and time.      Cranial Nerves: No cranial nerve deficit.   Psychiatric:         Behavior: Behavior normal.         Thought Content: Thought content normal.         Procedures           ED Course                                   Results for orders placed or performed during the hospital encounter of 05/28/24   Comprehensive Metabolic Panel    Specimen: Arm, Left; Blood   Result Value Ref Range    Glucose 95 65 - 99 mg/dL    BUN 11 6 - 20 mg/dL    Creatinine 0.92 0.76 - 1.27 mg/dL    Sodium 139 136 - 145 mmol/L    Potassium 3.5 3.5 - 5.2 mmol/L    Chloride 100 98 - 107 mmol/L    CO2 27.0 22.0 - 29.0 mmol/L    Calcium 9.7 8.6 - 10.5 mg/dL    Total  Protein 7.6 6.0 - 8.5 g/dL    Albumin 4.7 3.5 - 5.2 g/dL    ALT (SGPT) 13 1 - 41 U/L    AST (SGOT) 19 1 - 40 U/L    Alkaline Phosphatase 63 39 - 117 U/L    Total Bilirubin 0.4 0.0 - 1.2 mg/dL    Globulin 2.9 gm/dL    A/G Ratio 1.6 g/dL    BUN/Creatinine Ratio 12.0 7.0 - 25.0    Anion Gap 12.0 5.0 - 15.0 mmol/L    eGFR 110.6 >60.0 mL/min/1.73   Urinalysis With Microscopic If Indicated (No Culture) - Urine, Clean Catch    Specimen: Urine, Clean Catch   Result Value Ref Range    Color, UA Yellow Yellow, Straw    Appearance, UA Clear Clear    pH, UA 6.5 5.0 - 8.0    Specific Gravity, UA 1.013 1.005 - 1.030    Glucose, UA Negative Negative    Ketones, UA Negative Negative    Bilirubin, UA Negative Negative    Blood, UA Negative Negative    Protein, UA Negative Negative    Leuk Esterase, UA Small (1+) (A) Negative    Nitrite, UA Negative Negative    Urobilinogen, UA 0.2 E.U./dL 0.2 - 1.0 E.U./dL   Ethanol    Specimen: Arm, Left; Blood   Result Value Ref Range    Ethanol 64 (H) 0 - 10 mg/dL    Ethanol % 0.064 %   Urine Drug Screen - Urine, Clean Catch    Specimen: Urine, Clean Catch   Result Value Ref Range    THC, Screen, Urine Positive (A) Negative    Phencyclidine (PCP), Urine Negative Negative    Cocaine Screen, Urine Negative Negative    Methamphetamine, Ur Positive (A) Negative    Opiate Screen Negative Negative    Amphetamine Screen, Urine Positive (A) Negative    Benzodiazepine Screen, Urine Positive (A) Negative    Tricyclic Antidepressants Screen Negative Negative    Methadone Screen, Urine Negative Negative    Barbiturates Screen, Urine Negative Negative    Oxycodone Screen, Urine Negative Negative    Buprenorphine, Screen, Urine Positive (A) Negative   Magnesium    Specimen: Arm, Left; Blood   Result Value Ref Range    Magnesium 2.2 1.6 - 2.6 mg/dL   CBC Auto Differential    Specimen: Arm, Left; Blood   Result Value Ref Range    WBC 6.17 3.40 - 10.80 10*3/mm3    RBC 4.95 4.14 - 5.80 10*6/mm3    Hemoglobin 14.5  13.0 - 17.7 g/dL    Hematocrit 43.9 37.5 - 51.0 %    MCV 88.7 79.0 - 97.0 fL    MCH 29.3 26.6 - 33.0 pg    MCHC 33.0 31.5 - 35.7 g/dL    RDW 12.6 12.3 - 15.4 %    RDW-SD 40.8 37.0 - 54.0 fl    MPV 10.3 6.0 - 12.0 fL    Platelets 285 140 - 450 10*3/mm3    Neutrophil % 62.3 42.7 - 76.0 %    Lymphocyte % 23.7 19.6 - 45.3 %    Monocyte % 7.8 5.0 - 12.0 %    Eosinophil % 4.5 0.3 - 6.2 %    Basophil % 1.5 0.0 - 1.5 %    Immature Grans % 0.2 0.0 - 0.5 %    Neutrophils, Absolute 3.85 1.70 - 7.00 10*3/mm3    Lymphocytes, Absolute 1.46 0.70 - 3.10 10*3/mm3    Monocytes, Absolute 0.48 0.10 - 0.90 10*3/mm3    Eosinophils, Absolute 0.28 0.00 - 0.40 10*3/mm3    Basophils, Absolute 0.09 0.00 - 0.20 10*3/mm3    Immature Grans, Absolute 0.01 0.00 - 0.05 10*3/mm3    nRBC 0.0 0.0 - 0.2 /100 WBC   Fentanyl, Urine - Urine, Clean Catch    Specimen: Urine, Clean Catch   Result Value Ref Range    Fentanyl, Urine Negative Negative   Urinalysis, Microscopic Only - Urine, Clean Catch    Specimen: Urine, Clean Catch   Result Value Ref Range    RBC, UA 0-2 None Seen, 0-2 /HPF    WBC, UA 0-2 None Seen, 0-2 /HPF    Bacteria, UA None Seen None Seen /HPF    Squamous Epithelial Cells, UA None Seen None Seen, 0-2 /HPF    Hyaline Casts, UA None Seen None Seen /LPF    Methodology Automated Microscopy                Medical Decision Making  34-year-old male presents secondary to need for detox from methamphetamine and drugs.  Patient states he uses methamphetamine and drinks alcohol excessively.  He denies any homicidal ideation however he has had thoughts of killing himself.  He states he had thoughts of running his car into a tree.  He has a past medical history of anxiety/depression along with ADHD and substance abuse.  He presents by private vehicle.    Amount and/or Complexity of Data Reviewed  Labs: ordered. Decision-making details documented in ED Course.        Final diagnoses:   Substance abuse   Alcohol abuse       ED Disposition  ED  Disposition       ED Disposition   DC/Transfer to Behavioral Health    Condition   Stable    Comment   --               No follow-up provider specified.       Medication List      No changes were made to your prescriptions during this visit.            Gadiel Durbin PA  05/28/24 1920

## 2024-05-28 NOTE — NURSING NOTE
Presented pt to Dr. Orr and all abnormal labs. New order to admit patient. SP3. Routine orders. [id number].

## 2024-05-29 LAB
QT INTERVAL: 418 MS
QTC INTERVAL: 463 MS

## 2024-05-29 PROCEDURE — 99223 1ST HOSP IP/OBS HIGH 75: CPT | Performed by: PSYCHIATRY & NEUROLOGY

## 2024-05-29 RX ORDER — LORAZEPAM 2 MG/1
2 TABLET ORAL EVERY 4 HOURS PRN
Status: ACTIVE | OUTPATIENT
Start: 2024-05-30 | End: 2024-05-31

## 2024-05-29 RX ORDER — LORAZEPAM 1 MG/1
1 TABLET ORAL EVERY 4 HOURS PRN
Status: ACTIVE | OUTPATIENT
Start: 2024-06-01 | End: 2024-06-02

## 2024-05-29 RX ORDER — LORAZEPAM 1 MG/1
1 TABLET ORAL
Status: COMPLETED | OUTPATIENT
Start: 2024-06-01 | End: 2024-06-01

## 2024-05-29 RX ORDER — LORAZEPAM 0.5 MG/1
0.5 TABLET ORAL EVERY 4 HOURS PRN
Status: ACTIVE | OUTPATIENT
Start: 2024-06-02 | End: 2024-06-03

## 2024-05-29 RX ORDER — LORAZEPAM 2 MG/1
2 TABLET ORAL
Status: ACTIVE | OUTPATIENT
Start: 2024-05-29 | End: 2024-05-30

## 2024-05-29 RX ORDER — LORAZEPAM 2 MG/1
2 TABLET ORAL
Status: COMPLETED | OUTPATIENT
Start: 2024-05-30 | End: 2024-05-30

## 2024-05-29 RX ORDER — LORAZEPAM 0.5 MG/1
0.5 TABLET ORAL
Status: COMPLETED | OUTPATIENT
Start: 2024-06-02 | End: 2024-06-02

## 2024-05-29 RX ADMIN — Medication 100 MG: at 09:14

## 2024-05-29 RX ADMIN — NICOTINE TRANSDERMAL SYSTEM 1 PATCH: 21 PATCH, EXTENDED RELEASE TRANSDERMAL at 09:14

## 2024-05-29 RX ADMIN — Medication 1 TABLET: at 09:14

## 2024-05-29 RX ADMIN — Medication 2 TABLET: at 09:14

## 2024-05-29 RX ADMIN — HYDROXYZINE HYDROCHLORIDE 50 MG: 50 TABLET, FILM COATED ORAL at 15:54

## 2024-05-29 NOTE — H&P
INITIAL PSYCHIATRIC HISTORY & PHYSICAL    Patient Identification:  Name:  NAME@  Age:   34 y.o.  Sex:   male  :   1990  MRN:   [id number]  Visit Number:   [id number]  Primary Care Physician:   Provider, No Known    SUBJECTIVE    CC/Focus of Exam: Suicidal and detox    HPI: Van Heard is a 34 y.o. male who was admitted on 2024 with complaints of suicidal ideation and drug use and withdrawals. The patient reports a long history of substance use. First use was 12 years old. Over time the use increased and the patient  continued to use despite negative consequences including relationship problems, social and financial problems. The patient endorses symptoms of tolerance and withdrawals and ongoing cravings to use. Has tried to cut down and stop but has not been successful. Spends too much time and resources in pursuit of substance use. Longest period of sobriety is reported to be 6 months. although his report varies. Currently using meth, Valium, Adderall, marijuana, Suboxone, fifth of vodka -  most recent use a pint of vodka with 5-10 Valium per day.  Received Subucade several months prior to February of this year.  Last use 2024  Withdrawal symptoms nausea, tremors, headaches, chills, sweats  Patient states that he uses tobacco.  Patient states that he has a history of seizures and delirium tremens with withdrawal.  Patient states he quit going to meetings and relapsed.  Patient states finances as a stressor in his life.  Patient denies any history of physical or sexual abuse.  Patient states he has a history of mental abuse.  Patient states that he has been feeling suicidal with a plan to overdose on fentanyl.  Patient rates his appetite as poor.  Patient rates his sleep as poor.  Patient denies any nightmares.  Patient rates his anxiety on a scale of 1-10 with 10 being the most severe a 10.  Patient rates his depression on a scale of 1-10 with 10 being the most severe a 9.  Patient rates his  "cravings on a scale of 1-10 with 10 being the most severe a 5.  Patient's CIWA was 10.  Patient states he has suicidal ideation.  Patient denies any homicidal ideation.  Patient denies any hallucinations but states when he is on meth he has hallucinations.  Patient was admitted to [facility] for further safety and stabilization.    Available medical/psychiatric records reviewed and incorporated into the current document.     PAST PSYCHIATRIC HX: Patient has had 2 prior admissions with the most recent on 1- - 1-.  Patient denies any outpatient care.  Intervening admission at the [facility] in [place] February of this year, another detox.    SUBSTANCE USE HX: UDS was positive for methamphetamine, amphetamine, benzodiazepine, buprenorphine, THC.  See HPI for current use.         FAMILY HX: History of anxiety, depression, suicide, drug abuse, alcohol abuse on both sides of family for maternal uncles and great paternal uncle alcoholics.    SOCIAL HX: Patient states he was born and raised in [place].  Patient states he currently resides by himself in [place].  Patient states he is single and has no children.  Patient states he is currently employed with [employer].  Patient states he has a high school diploma.  Patient denies any legal issues.  Patient states he has a lot of support and connections in [place].    Past Medical History:   Diagnosis Date    ADHD (attention deficit hyperactivity disorder)     childhood    Alcohol abuse     Anxiety     Depression     Substance abuse     Suicide attempt     \"I slit my wrist.\" 2009    Withdrawal symptoms, drug or narcotic        Past Surgical History:   Procedure Laterality Date    HERNIA REPAIR  2002    WISDOM TOOTH EXTRACTION  2009       Family History   Problem Relation Age of Onset    Alcohol abuse Mother     Alcohol abuse Father     Drug abuse Father          No medications prior to admission.           ALLERGIES:  " Patient has no known allergies.    Temp:  [97.3 °F (36.3 °C)-97.5 °F (36.4 °C)] 97.3 °F (36.3 °C)  Heart Rate:  [73-97] 73  Resp:  [18] 18  BP: ()/(61-81) 99/61    REVIEW OF SYSTEMS:  Review of Systems   Constitutional: Negative.    HENT: Negative.     Eyes: Negative.    Respiratory: Negative.     Cardiovascular: Negative.    Gastrointestinal: Negative.    Endocrine: Negative.    Genitourinary: Negative.    Musculoskeletal: Negative.    Skin: Negative.    Allergic/Immunologic: Negative.    Neurological:  Positive for tremors.   Hematological: Negative.       See HPI for psychiatric ROS  OBJECTIVE    PHYSICAL EXAM:  Physical Exam  Vitals and nursing note reviewed. Exam conducted with a chaperone present.   Constitutional:       Appearance: Normal appearance. He is normal weight.   HENT:      Head: Normocephalic and atraumatic.      Right Ear: External ear normal.      Left Ear: External ear normal.      Nose: Nose normal.      Mouth/Throat:      Pharynx: Oropharynx is clear.   Eyes:      Extraocular Movements: Extraocular movements intact.      Conjunctiva/sclera: Conjunctivae normal.      Pupils: Pupils are equal, round, and reactive to light.   Cardiovascular:      Rate and Rhythm: Normal rate and regular rhythm.      Pulses: Normal pulses.   Pulmonary:      Effort: Pulmonary effort is normal.   Abdominal:      General: Abdomen is flat. Bowel sounds are normal.      Palpations: Abdomen is soft.   Musculoskeletal:         General: Normal range of motion.      Cervical back: Normal range of motion and neck supple.   Skin:     General: Skin is warm and dry.   Neurological:      General: No focal deficit present.      Mental Status: He is alert and oriented to person, place, and time.      Comments: Resting tremor in her upper extremities         MENTAL STATUS EXAM:               Hygiene:   fair  Cooperation:  Cooperative  Eye Contact:  Good  Psychomotor Behavior:  Appropriate  Affect:  Appropriate  Hopelessness:  6  Speech:  Normal  Linear  Thought Content:  Normal  Suicidal:  Suicidal Ideation  Homicidal:  None  Hallucinations:  None  Delusion:  None  Memory:  Intact  Orientation:  Person, Place, Time, and Situation  Reliability:  fair  Insight:  Fair  Judgement:  Poor  Impulse Control:  Poor      Imaging Results (Last 24 Hours)       ** No results found for the last 24 hours. **             ECG/EMG Results (most recent)       Procedure Component Value Units Date/Time    ECG 12 Lead Other; Baseline Cardiac Status [[id number]] Collected: 05/28/24 1607     Updated: 05/29/24 0947     QT Interval 418 ms      QTC Interval 463 ms     Narrative:      Test Reason : Other~  Blood Pressure :   */*   mmHG  Vent. Rate :  74 BPM     Atrial Rate :  74 BPM     P-R Int : 120 ms          QRS Dur : 140 ms      QT Int : 418 ms       P-R-T Axes :  55  25  83 degrees     QTc Int : 463 ms    Normal sinus rhythm  Right bundle branch block  Abnormal ECG  When compared with ECG of 28-MAY-2024 16:07, (Unconfirmed)  No significant change was found  Confirmed by Ronni Foster ([id number]) on 5/29/2024 9:46:46 AM    Referred By: NAA           Confirmed By: Ronni Foster             Lab Results   Component Value Date    GLUCOSE 95 05/28/2024    BUN 11 05/28/2024    CREATININE 0.92 05/28/2024    EGFRIFNONA 80 03/21/2018    BCR 12.0 05/28/2024    CO2 27.0 05/28/2024    CALCIUM 9.7 05/28/2024    ALBUMIN 4.7 05/28/2024    AST 19 05/28/2024    ALT 13 05/28/2024       Lab Results   Component Value Date    WBC 6.17 05/28/2024    HGB 14.5 05/28/2024    HCT 43.9 05/28/2024    MCV 88.7 05/28/2024     05/28/2024       Pain Management Panel  More data exists         Latest Ref Rng & Units 5/28/2024 1/21/2024   Pain Management Panel   Amphetamine, Urine Qual Negative Positive  Positive  Positive    Barbiturates Screen, Urine Negative Negative  Negative  Negative    Benzodiazepine Screen, Urine Negative Positive  Positive  Positive    Buprenorphine, Screen, Urine  Negative Positive  Positive  Positive    Cocaine Screen, Urine Negative Negative  Negative  Negative    Fentanyl, Urine Negative Negative  Positive  Positive    Methadone Screen , Urine Negative Negative  Negative  Negative    Methamphetamine, Ur Negative Positive  Positive  Positive        Brief Urine Lab Results  (Last result in the past 365 days)        Color   Clarity   Blood   Leuk Est   Nitrite   Protein   CREAT   Urine HCG        05/28/24 1022 Yellow   Clear   Negative   Small (1+)   Negative   Negative                   Reviewed labs and studies done with this admission.       ASSESSMENT & PLAN:        Other recurrent depressive disorders  Will utilize a bedtime dose of mirtazapine plus a supportive individual and group psychotherapeutic effort      Suicidal ideation  Patient is on precautions.      Alcohol dependence with withdrawal  Lorazepam detox along with multivitamins and thiamine      Methamphetamine abuse  Encourage cessation and anticipate patient entering residential CD recovery program post hospital      Benzodiazepine abuse  Same as with the alcohol detox    Opiate abuse  Will not need to address with the detox protocol.            Hospital bed: No    The patient has been admitted for safety and stabilization.  Patient will be monitored for suicidality daily and maintained on Special Precautions Level 3 (q15 min checks) . The patient will have individual and group therapy with a master's level therapist. A master treatment plan will be developed and agreed upon by the patient and his/her treatment team.  The patient's estimated length of stay in the hospital is 5-7 days.       I spent a total of 75 minutes in direct patient care, greater than 40 minutes (greater than 50%) were spent face-to-face with assessment, coordination of care, counseling,  and answering any questions the patient had regarding his status and the treatment plan.     Written by Helen Becerril acting as scribe for   Chrissy signature on this note affirms that the note adequately documents the care provided.     HEMA Lowe MD    05/29/24  8:19 AM EDT

## 2024-05-29 NOTE — PROGRESS NOTES
Navigator is helping with the following referrals:    [facility] [place] [phone]  -Can not do phone screening until Sunday at 4:00pm.  5/29    [facility] - [phone]  -Patient can call [facility] at 8:00pm to complete phone screening.  5/29

## 2024-05-29 NOTE — PLAN OF CARE
Goal Outcome Evaluation:  Plan of Care Reviewed With: patient  Patient Agreement with Plan of Care: agrees     Progress: no change  Outcome Evaluation: Pt reports anxiety and depression 9/10. Pt denies SI/HI/AVH. Pt reports poor sleep and appetite. Pt had c/o leg and muscle cramps.       Pt has slept approximately 6.5 hours this shift.

## 2024-05-29 NOTE — PLAN OF CARE
Goal Outcome Evaluation:  Plan of Care Reviewed With: patient  Patient Agreement with Plan of Care: agrees     Progress: no change  Outcome Evaluation: Patient has been withdrawn to his room and has slept the majority of the day. Rates anxiety and depression both a 9. Denies SI/HI or AVH. Patient denied any withdrawal symptoms or cravings during morning shift assessment. Patient was started on Ativan detox today.

## 2024-05-29 NOTE — CONSULTS
Adult Nutrition  Assessment    Patient Name:  Van Heard  YOB: 1990  MRN: [id number]  Admit Date:  5/28/2024    Assessment Date:  5/29/2024    Comments:  Patient has lost 12 lbs in past 4 months.  Ate 100% of breakfast and snack last night.  Will start Oral Nutrition Supplement (ONS) BID and monitor intakes and pertinent labs.       Reason for Assessment       Row Name 05/29/24 0919          Reason for Assessment    Reason For Assessment identified at risk by screening criteria     Identified At Risk by Screening Criteria MST SCORE 2+                      Labs/Tests/Procedures/Meds       Row Name 05/29/24 0919          Labs/Procedures/Meds    Lab Results Reviewed reviewed        Medications    Pertinent Medications Reviewed reviewed                      Estimated/Assessed Needs - Anthropometrics       Row Name 05/29/24 0920          Anthropometrics    Age for Calculations 34     Weight for Calculation 61.8 kg (136 lb 3.9 oz)     Additional Documentation Ideal Body Weight (IBW) (Group)        Ideal Body Weight (IBW)    Ideal Body Weight 80.9kg (the [place]ii Method)        Estimated/Assessed Needs    Additional Documentation Fluid Requirements (Group);Riverside-St. Jeor Equation (Group);Protein Requirements (Group)        Riverside-St. Jeor Equation    RMR (Riverside-St. Jeor Equation) 1586     Riverside-St. Jeor Activity Factors 1.4 - 1.5     Activity Factors (Riverside-St. Jeor) 2220.4 - 2379        Protein Requirements    Weight Used For Protein Calculations 61.8 kg (136 lb 3.9 oz)     Est Protein Requirement Amount (gms/kg) 1.2 gm protein     Estimated Protein Requirements (gms/day) 74.16        Fluid Requirements    Fluid Requirements (mL/day) 2220     Estimated Fluid Requirement Method RDA Method     RDA Method (mL) 2220                    Nutrition Prescription Ordered       Row Name 05/29/24 0921          Nutrition Prescription PO    Current PO Diet Regular     Fluid Consistency Thin                     Evaluation of Received Nutrient/Fluid Intake       Row Name 05/29/24 0922          PO Evaluation    Number of Meals 1     % PO Intake 100                       Electronically signed by:  Le Sands RD  05/29/24 09:40 EDT

## 2024-05-29 NOTE — PLAN OF CARE
Goal Outcome Evaluation:  Plan of Care Reviewed With: patient  Patient Agreement with Plan of Care: agrees  Consent Given to Review Plan with: no one  Progress: no change  Outcome Evaluation: Therapist met with Patient to review care plan, social history, and aftercare recommendations; Patient agreeable.        Problem: Adult Behavioral Health Plan of Care  Goal: Plan of Care Review  Outcome: Ongoing, Progressing  Flowsheets (Taken 5/29/2024 1126)  Consent Given to Review Plan with: no one  Progress: no change  Plan of Care Reviewed With: patient  Patient Agreement with Plan of Care: agrees  Outcome Evaluation:   Therapist met with Patient to review care plan, social history, and aftercare recommendations   Patient agreeable.  Goal: Patient-Specific Goal (Individualization)  Outcome: Ongoing, Progressing  Flowsheets  Taken 5/29/2024 1126 by Lina Santiago MSW  Patient-Specific Goals (Include Timeframe): Identify 2-3 coping skills, address relapse prevention measures, cmplete aftercre plans, and deny SI/HI prior to discharge.  Individualized Care Needs: Therapist to offer 1-4 therapy sessions, aftercare planning, safety planning, family education, group therapy, and brief CBT/MI interventions.  Taken 5/29/2024 1121 by Lina Santiago MSW  Patient Personal Strengths:   resilient   resourceful   self-reliant  Patient Vulnerabilities:   substance abuse/addiction   lacks insight into illness   poor impulse control   adverse childhood experience(s)  Taken 5/28/2024 1338 by Marlee Ray RN  Anxieties, Fears or Concerns: None verbalized  Goal: Optimized Coping Skills in Response to Life Stressors  Outcome: Ongoing, Progressing  Flowsheets (Taken 5/29/2024 1126)  Optimized Coping Skills in Response to Life Stressors: making progress toward outcome  Intervention: Promote Effective Coping Strategies  Flowsheets (Taken 5/29/2024 1126)  Supportive Measures:   active listening utilized   counseling provided    goal-setting facilitated   verbalization of feelings encouraged  Goal: Develops/Participates in Therapeutic [place] to Support Successful Transition  Outcome: Ongoing, Progressing  Flowsheets (Taken 5/29/2024 1126)  Develops/Participates in Therapeutic [place] to Support Successful Transition: making progress toward outcome  Intervention: Foster Therapeutic [place]  Flowsheets (Taken 5/29/2024 1126)  Trust Relationship/Rapport:   care explained   reassurance provided   choices provided   thoughts/feelings acknowledged   empathic listening provided   emotional support provided   questions answered   questions encouraged  Intervention: Mutually Develop Transition Plan  Flowsheets  Taken 5/29/2024 1126 by Lina Santiago MSW  Outpatient/Agency/Support Group Needs:   outpatient counseling   residential services  Discharge Coordination/Progress:   Therapist met with Patient to complete discharge needs assessment   Patient agreeable.  Transition Support: community resources reviewed  Anticipated Discharge Disposition: residential substance use unit  Transportation Concerns: no car  Current Discharge Risk:   substance use/abuse   psychiatric illness  Concerns to be Addressed:   substance/tobacco abuse/use   mental health  Readmission Within the Last 30 Days: no previous admission in last 30 days  Patient's Choice of Community Agency(s): sober living  Offered/Gave Vendor List: no  Taken 5/28/2024 1338 by Marlee Ray, RN  Transportation Anticipated: agency  Patient/Family Anticipated Services at Transition:   rehabilitation services   mental health services  Patient/Family Anticipates Transition to: (Sober Living) inpatient rehabilitation facility     DATA: Therapist met individually with patient this date to introduce role and to discuss hospitalization expectations. Patient agreeable.     Patient signed consent for sober living homes.     Patient declines family involvement at this time.      Clinical  Maneuvering/Intervention:     Therapist assisted patient in processing session content; acknowledged and normalized patient’s thoughts, feelings, and concerns.  Discussed the therapist/patient relationship and explain the parameters and limitations of relative confidentiality.  Also discussed the importance of active participation, and honesty to the treatment process.  Encouraged the patient to discuss/vent their feelings, frustrations, and fears concerning their ongoing medical issues and validated their feelings.     Discussed the importance of finding enjoyable activities and coping skills that the patient can engage in a regular basis. Discussed healthy coping skills such as distraction, self love, grounding, thought challenges/reframing, etc.  Provided patient with list of healthy coping skills this date. Discussed the importance of medication compliance.  Praised the patient for seeking help and spent the majority of the session building rapport.       Allowed patient to freely discuss issues without interruption or judgment. Provided safe, confidential environment to facilitate the development of positive therapeutic relationship and encourage open, honest communication.      Therapist addressed discharge safety planning this date. Assisted patient in identifying risk factors which would indicate the need for higher level of care after discharge;  including thoughts to harm self or others and/or self-harming behavior. Encouraged patient to call 911, or present to the nearest emergency room should any of these events occur. Discussed crisis intervention services and means to access.  Encouraged securing any objects of harm.       Therapist completed integrated summary, treatment plan, and initiated social history this date.  Therapist is strongly encouraging family involvement in treatment.       ASSESSMENT: Van Heard is a 34 year old  male who has a high school education and currently works at  [employer]. Patient states that he has his own apartment and lives alone at this time. Patient was admitted due to concern for SI with thoughts to overdose. Patient was last here 1/24/24. He has recently been using a lot of substances including alcohol, meth, benzos, buprenorphine, and THC. Patient states that he is in a bad environment now. He feels that it would be better for him to relocate and go to a sober living as he is not doing well living on his own. He reports feeling poorly at this time. Despite this he was polite and cooperative with assessment.      PLAN:       Patient to remain hospitalized this date.     Treatment team will focus efforts on stabilizing patient's acute symptoms while providing education on healthy coping and crisis management to reduce hospitalizations.   Patient requires daily psychiatrist evaluation and 24/7 nursing supervision to promote patient  safety.     Therapist will offer 1-4 individual sessions, 1 therapy group daily, family education, and appropriate referral.    Therapist recommends residential [facility] program.

## 2024-05-30 PROCEDURE — 99232 SBSQ HOSP IP/OBS MODERATE 35: CPT | Performed by: PSYCHIATRY & NEUROLOGY

## 2024-05-30 RX ADMIN — LORAZEPAM 2 MG: 2 TABLET ORAL at 09:02

## 2024-05-30 RX ADMIN — Medication 1 TABLET: at 08:29

## 2024-05-30 RX ADMIN — Medication 2 TABLET: at 08:29

## 2024-05-30 RX ADMIN — NICOTINE TRANSDERMAL SYSTEM 1 PATCH: 21 PATCH, EXTENDED RELEASE TRANSDERMAL at 08:29

## 2024-05-30 RX ADMIN — Medication 100 MG: at 08:29

## 2024-05-30 RX ADMIN — LORAZEPAM 2 MG: 2 TABLET ORAL at 21:03

## 2024-05-30 RX ADMIN — LORAZEPAM 2 MG: 2 TABLET ORAL at 14:14

## 2024-05-30 NOTE — PLAN OF CARE
Goal Outcome Evaluation:  Plan of Care Reviewed With: patient  Patient Agreement with Plan of Care: agrees  Consent Given to Review Plan with: no one  Progress: improving  Outcome Evaluation: Therapist met with Patient to review treatment progress and aftercare plans; Patient agreeable.        Problem: Adult Behavioral Health Plan of Care  Goal: Plan of Care Review  Outcome: Ongoing, Progressing  Flowsheets  Taken 5/30/2024 1029  Progress: improving  Plan of Care Reviewed With: patient  Patient Agreement with Plan of Care: agrees  Outcome Evaluation:   Therapist met with Patient to review treatment progress and aftercare plans   Patient agreeable.  Taken 5/29/2024 1126  Consent Given to Review Plan with: no one  Goal: Optimized Coping Skills in Response to Life Stressors  Outcome: Ongoing, Progressing  Flowsheets (Taken 5/30/2024 1029)  Optimized Coping Skills in Response to Life Stressors: making progress toward outcome  Intervention: Promote Effective Coping Strategies  Flowsheets (Taken 5/30/2024 1029)  Supportive Measures:   active listening utilized   counseling provided   goal-setting facilitated   verbalization of feelings encouraged  Goal: Develops/Participates in Therapeutic [place] to Support Successful Transition  Outcome: Ongoing, Progressing  Flowsheets (Taken 5/30/2024 1029)  Develops/Participates in Therapeutic [place] to Support Successful Transition: making progress toward outcome  Intervention: Foster Therapeutic [place]  Flowsheets (Taken 5/30/2024 1029)  Trust Relationship/Rapport:   care explained   reassurance provided   choices provided   emotional support provided   thoughts/feelings acknowledged   empathic listening provided   questions answered   questions encouraged     DATA: Therapist met with Patient individually this date. Patient agreeable to discuss current treatment progress and discharge concerns.     Provided Patient with the number to [facility] so he could call and complete  phone screening this afternoon.     CLINICAL MANUVERING/INTERVENTIONS:  Assisted Patient in processing session content; acknowledged and normalized Patient’s thoughts, feelings, and concerns by utilizing a person-centered approach in efforts to build appropriate rapport and a positive therapeutic relationship with open and honest communication. Allowed Patient to ventilate regarding current stressors and triggers for negative emotions and thoughts in a safe nonjudgmental environment with unconditional positive regard, active listening skills, and empathy.     ASSESSMENT: Patient was seen 1-1 for a follow up today. He continues to receive treatment for depression and detox. He reports feeling slightly better today but is still struggling with withdrawal symptoms. Patient reports that he has been to [facility] before and feels that he could likely return if he wanted to. However he still seems unsure about his plan. He states that he has done treatment so many times and that he has learned to the education that it provides. However it seems that he has trouble applying the information to his life. This therapist will continue to encourage him to be open minded about treatment options.     PLAN:   Patient will continue stabilization. Patient will continue to receive services offered by Treatment Team.     Patient is considering sober living.

## 2024-05-30 NOTE — PLAN OF CARE
Goal Outcome Evaluation:  Plan of Care Reviewed With: patient  Patient Agreement with Plan of Care: agrees     Progress: improving  Outcome Evaluation: Patient calm and cooperative. Patient continues to be withdrawn to his room and avoid social interactions. Rates anxiety a 1 and depression a 5. Rates craving a 5 with complaints of bodyaches. Denies SI/HI or AVH.

## 2024-05-30 NOTE — PROGRESS NOTES
Navigator is helping with the following ref[place]:     [facility] [phone]  -Can not do phone screening until Sunday at 4:00pm.  5/29     [facility] - [phone]  -Patient can call [facility] at 8:00pm to complete phone screening.  5/29  -No beds available now.  5/30    [facility] - [phone]  -Patient to call today at 2:00pm to complete phone screening.  5/30

## 2024-05-30 NOTE — PLAN OF CARE
Goal Outcome Evaluation:  Plan of Care Reviewed With: patient  Patient Agreement with Plan of Care: agrees     Progress: no change  Outcome Evaluation: Pt calm and cooperative during assessment. Pt rated anxiety 8/10 and depression 5/10, denied SI/HI/AVH. CIWA 4.

## 2024-05-30 NOTE — PROGRESS NOTES
"INPATIENT PSYCHIATRIC PROGRESS NOTE    Name:  Van Heard  :  1990  MRN:  [id number]  Visit Number:  [id number]  Length of stay:  2    Behavioral Health Treatment Plan and Problem List: I have reviewed and approved the Behavioral Health Treatment Plan and Problem list.    SUBJECTIVE    CC/ Focus of exam: Depression/substance abuse    Patient's subjective status: \"Maybe a little better\"    INTERVAL HISTORY: The patient slept 9 hours, his affect is more appropriate and subjectively he is less depressed without active suicidal thoughts, patient oriented with hallucinatory phenomena but does have a resting tremor indicative of the benzodiazepine withdrawal.  Continuing detox over the next 3 to 4 days aiming for transition to a residential chemical dependency treatment program Monday [address] 3.  Depression rating 5/10  Anxiety rating 5/10  Hopelessness: 0/10  Sleep: 9 hours  Withdrawal sx: Involuntary tremor of upper extremities  Craving: Says not     ROS: No cardiovascular, GI, or Neurological complaints.       OBJECTIVE    Vitals:    24 0731   BP: 94/61   Pulse: 74   Resp: 16   Temp: 97.8 °F (36.6 °C)   SpO2: 98%      Temp:  [97.2 °F (36.2 °C)-97.8 °F (36.6 °C)] 97.8 °F (36.6 °C)  Heart Rate:  [70-99] 74  Resp:  [16-18] 16  BP: ()/(56-81) 94/61    MENTAL STATUS EXAM:       Psychomotor: No psychomotor agitation/retardation, No EPS, No motor tics  Speech-normal rate, amount.  Mood/Affect: Depressed  Thought Processes: coherent  Thought Content: mood congruent  Hallucination(s): none  Hopelessness: No  Optimistic: minimally  Suicidal Thoughts: No  Suicidal Plan/Intent: No  Homicidal Thoughts: absent  Orientation: oriented x 3  Memory: recent intact    Lab Results (last 24 hours)       ** No results found for the last 24 hours. **             Imaging Results (Last 24 Hours)       ** No results found for the last 24 hours. **             Lab and x-ray studies reviewed.    ECG/EMG Results (most recent)  "      Procedure Component Value Units Date/Time    ECG 12 Lead Other; Baseline Cardiac Status [[id number]] Collected: 05/28/24 1607     Updated: 05/29/24 0947     QT Interval 418 ms      QTC Interval 463 ms     Narrative:      Test Reason : Other~  Blood Pressure :   */*   mmHG  Vent. Rate :  74 BPM     Atrial Rate :  74 BPM     P-R Int : 120 ms          QRS Dur : 140 ms      QT Int : 418 ms       P-R-T Axes :  55  25  83 degrees     QTc Int : 463 ms    Normal sinus rhythm  Right bundle branch block  Abnormal ECG  When compared with ECG of 28-MAY-2024 16:07, (Unconfirmed)  No significant change was found  Confirmed by Ronni Foster ([id number]) on 5/29/2024 9:46:46 AM    Referred By: NAA           Confirmed By: Ronni Foster             ALLERGIES: Patient has no known allergies.    Medications:     B-complex with vitamin C, 2 tablet, Oral, Daily  LORazepam, 2 mg, Oral, 3 times per day   Followed by  [START ON 5/31/2024] LORazepam, 1.5 mg, Oral, 3 times per day   Followed by  [START ON 6/1/2024] LORazepam, 1 mg, Oral, 3 times per day   Followed by  [START ON 6/2/2024] LORazepam, 0.5 mg, Oral, 3 times per day  multivitamin with minerals, 1 tablet, Oral, Daily  nicotine, 1 patch, Transdermal, Q24H  thiamine, 100 mg, Oral, Daily       ASSESSMENT & PLAN     Other recurrent depressive disorders  Will utilize a bedtime dose of mirtazapine plus a supportive individual and group psychotherapeutic effort       Suicidal ideation  Patient is on precautions.       Alcohol dependence with withdrawal  Lorazepam detox along with multivitamins and thiamine       Methamphetamine abuse  Encourage cessation and anticipate patient entering residential CD recovery program post hospital       Benzodiazepine abuse  Same as with the alcohol detox     Opiate abuse  Will not need to address with the detox protocol.      Special precautions: Special Precautions Level 3 (q15 min checks)     Behavioral Health Treatment Plan and Problem List: I have  reviewed and approved the Behavioral Health Treatment Plan and Problem list.    I spent a total of 26 minutes in direct patient care including  17 minutes face to face with the patient assessment, coordination of care, and counseling the patient on the current and follow-up treatment plans regarding his status and situation.  Patient had no additional questions.     HEMA Lowe MD    Clinician:  Hai Lowe MD  05/30/24  08:19 EDT    Dictated utilizing Dragon dictation

## 2024-05-30 NOTE — NURSING NOTE
Willam Nieves - [phone] - offered to assist patient to make call for phone screening.  Contact made to above number numerous times with this 1 hr time frame with no answer.     Will attempt to aide patient in completion again in am

## 2024-05-31 PROCEDURE — 99232 SBSQ HOSP IP/OBS MODERATE 35: CPT | Performed by: PSYCHIATRY & NEUROLOGY

## 2024-05-31 RX ADMIN — LORAZEPAM 1.5 MG: 1 TABLET ORAL at 08:25

## 2024-05-31 RX ADMIN — LORAZEPAM 1.5 MG: 1 TABLET ORAL at 14:13

## 2024-05-31 RX ADMIN — NICOTINE TRANSDERMAL SYSTEM 1 PATCH: 21 PATCH, EXTENDED RELEASE TRANSDERMAL at 08:24

## 2024-05-31 RX ADMIN — Medication 2 TABLET: at 08:24

## 2024-05-31 RX ADMIN — Medication 100 MG: at 08:24

## 2024-05-31 RX ADMIN — LORAZEPAM 1.5 MG: 1 TABLET ORAL at 21:02

## 2024-05-31 RX ADMIN — HYDROXYZINE HYDROCHLORIDE 50 MG: 50 TABLET, FILM COATED ORAL at 08:24

## 2024-05-31 RX ADMIN — Medication 1 TABLET: at 08:24

## 2024-05-31 NOTE — PLAN OF CARE
Goal Outcome Evaluation:  Plan of Care Reviewed With: patient  Patient Agreement with Plan of Care: agrees        Outcome Evaluation: Patient reports anxiety 6 /depression5  Patient iinteractng more with peers this shift. . Pt denies any SI/HI/AVH. Pt reports WD symptoms-  body aches, chills/ medications seem to aide with symptoms/  Patient present in evening  nursing group- minimal interactive. No complaints this shift

## 2024-05-31 NOTE — PROGRESS NOTES
"INPATIENT PSYCHIATRIC PROGRESS NOTE    Name:  Van Heard  :  1990  MRN:  [id number]  Visit Number:  [id number]  Length of stay:  3    SUBJECTIVE    CC/Focus of Exam: Depression, substance use    INTERVAL HISTORY:  The patient reports he is feeling depressed, has shakes, feeling hot and cold, but is feeling a little better.   Depression rating 5/10  Anxiety rating 6/10  Sleep: good  Withdrawal sx: Headache, leg cramps, stomach cramps.   Craving: 3/10 mostly for alcohol    Review of Systems   Constitutional:  Positive for chills, diaphoresis and fatigue.   Gastrointestinal:  Positive for nausea.   Neurological:  Positive for tremors and weakness.   Psychiatric/Behavioral:  Positive for dysphoric mood. The patient is nervous/anxious.        OBJECTIVE    Temp:  [96.9 °F (36.1 °C)-98.2 °F (36.8 °C)] 96.9 °F (36.1 °C)  Heart Rate:  [] 61  Resp:  [16-18] 18  BP: (105-133)/(54-73) 105/54    MENTAL STATUS EXAM:  Appearance:Casually dressed, good hygeine.   Cooperation:Cooperative  Psychomotor: No psychomotor agitation/retardation, No EPS, No motor tics  Speech-normal rate, amount.  Mood \"anxious\"   Affect-congruent, appropriate, stable  Thought Content-goal directed, no delusional material present  Thought process-linear, organized.  Suicidality: No SI  Homicidality: No HI  Perception: No AH/VH  Insight-fair   Judgement-fair    Lab Results (last 24 hours)       ** No results found for the last 24 hours. **               Imaging Results (Last 24 Hours)       ** No results found for the last 24 hours. **               ECG/EMG Results (most recent)       Procedure Component Value Units Date/Time    ECG 12 Lead Other; Baseline Cardiac Status [[id number]] Collected: 24 1607     Updated: 24 0947     QT Interval 418 ms      QTC Interval 463 ms     Narrative:      Test Reason : Other~  Blood Pressure :   */*   mmHG  Vent. Rate :  74 BPM     Atrial Rate :  74 BPM     P-R Int : 120 ms          QRS Dur : 140 " ms      QT Int : 418 ms       P-R-T Axes :  55  25  83 degrees     QTc Int : 463 ms    Normal sinus rhythm  Right bundle branch block  Abnormal ECG  When compared with ECG of 28-MAY-2024 16:07, (Unconfirmed)  No significant change was found  Confirmed by Ronni Foster () on 2024 9:46:46 AM    Referred By: NAA           Confirmed By: Ronni Foster             ALLERGIES: Patient has no known allergies.    Medication Review:   Scheduled Medications:  B-complex with vitamin C, 2 tablet, Oral, Daily  LORazepam, 1.5 mg, Oral, 3 times per day   Followed by  [START ON 2024] LORazepam, 1 mg, Oral, 3 times per day   Followed by  [START ON 2024] LORazepam, 0.5 mg, Oral, 3 times per day  multivitamin with minerals, 1 tablet, Oral, Daily  nicotine, 1 patch, Transdermal, Q24H  thiamine, 100 mg, Oral, Daily         PRN Medications:    acetaminophen    aluminum-magnesium hydroxide-simethicone    benzonatate    famotidine    hydrOXYzine    ibuprofen    loperamide    [] LORazepam **FOLLOWED BY** LORazepam **FOLLOWED BY** [START ON 2024] LORazepam **FOLLOWED BY** [START ON 2024] LORazepam    magnesium hydroxide    ondansetron ODT    sodium chloride    traZODone   All medications reviewed.    ASSESSMENT & PLAN:      Other recurrent depressive disorders  -Continue mirtazapine      Suicidal ideation  -SP3      Alcohol dependence with withdrawal  -Ativan detox  -Thiamine and folate      Benzodiazepine abuse  -Ativan detox      Methamphetamine abuse  -Supportive treatment      Special precautions: Special Precautions Level 3 (q15 min checks) .    Behavioral Health Treatment Plan and Problem List: I have reviewed and approved the Behavioral Health Treatment Plan and Problem list.  The patient has had a chance to review and agrees with the treatment plan.    Copied text in portions of this note has been reviewed and is accurate as of 24         Clinician:  Columba Jones MD  24  09:24 EDT

## 2024-05-31 NOTE — PLAN OF CARE
Goal Outcome Evaluation:  Plan of Care Reviewed With: patient  Patient Agreement with Plan of Care: agrees        Outcome Evaluation: Rated anxiety 6/10, depression 4/10. Denied SI, HI, AVH. Was calm and cooperative.  Appropriate with staff and other patients, sat in dayroom for a while during free time and watched tv.    Slept 8 hours and is still asleep.

## 2024-05-31 NOTE — PLAN OF CARE
Goal Outcome Evaluation:  Plan of Care Reviewed With: patient  Patient Agreement with Plan of Care: agrees  Consent Given to Review Plan with: no one  Progress: improving  Outcome Evaluation: Therapist met with Patient to review treatment progress and aftercare plans; Patient agreeable.        Problem: Adult Behavioral Health Plan of Care  Goal: Plan of Care Review  Outcome: Ongoing, Progressing  Flowsheets  Taken 5/31/2024 1042  Progress: improving  Plan of Care Reviewed With: patient  Patient Agreement with Plan of Care: agrees  Outcome Evaluation:   Therapist met with Patient to review treatment progress and aftercare plans   Patient agreeable.  Taken 5/29/2024 1126  Consent Given to Review Plan with: no one  Goal: Optimized Coping Skills in Response to Life Stressors  Outcome: Ongoing, Progressing  Flowsheets (Taken 5/31/2024 1042)  Optimized Coping Skills in Response to Life Stressors: making progress toward outcome  Intervention: Promote Effective Coping Strategies  Flowsheets (Taken 5/31/2024 1042)  Supportive Measures:   active listening utilized   counseling provided   goal-setting facilitated   verbalization of feelings encouraged  Goal: Develops/Participates in [facility] to Support Successful Transition  Outcome: Ongoing, Progressing  Flowsheets (Taken 5/31/2024 1042)  Develops/Participates in [facility] to Support Successful Transition: making progress toward outcome  Intervention: [facility]  Flowsheets (Taken 5/31/2024 1042)  Trust Relationship/Rapport:   care explained   reassurance provided   choices provided   thoughts/feelings acknowledged   emotional support provided   empathic listening provided   questions answered   questions encouraged     DATA: Therapist met with Patient individually this date. Patient agreeable to discuss current treatment progress and discharge concerns.     Patient signed consent for his mother, Sarai. This therapist called and spoke  with her today to provide an update. She appears very supportive and was appreciative of the call.     Patient reports that he has been too sick to call [facility] but still wants to go there, and plans to call soon.     CLINICAL MANUVERING/INTERVENTIONS:  Assisted Patient in processing session content; acknowledged and normalized Patient’s thoughts, feelings, and concerns by utilizing a person-centered approach in efforts to build appropriate rapport and a positive therapeutic relationship with open and honest communication. Allowed Patient to ventilate regarding current stressors and triggers for negative emotions and thoughts in a safe nonjudgmental environment with unconditional positive regard, active listening skills, and empathy.    ASSESSMENT: Patient was seen 1-1 for a follow up today. He continues to receive treatment for depression and detox. Patient reports still feeling poorly due to withdrawal symptoms. However he reports still being motivated to get sober. He states that he is trying to set boundaries with negative friends that have been trying to reach out. Patient continues to be calm and cooperative.     PLAN:   Patient will continue stabilization. Patient will continue to receive services offered by Treatment Team.     Patient plans to find a sober living to go to.

## 2024-06-01 PROCEDURE — 99232 SBSQ HOSP IP/OBS MODERATE 35: CPT | Performed by: PSYCHIATRY & NEUROLOGY

## 2024-06-01 RX ADMIN — Medication 2 TABLET: at 08:25

## 2024-06-01 RX ADMIN — Medication 100 MG: at 08:25

## 2024-06-01 RX ADMIN — LORAZEPAM 1 MG: 1 TABLET ORAL at 21:03

## 2024-06-01 RX ADMIN — LORAZEPAM 1 MG: 1 TABLET ORAL at 08:25

## 2024-06-01 RX ADMIN — LORAZEPAM 1 MG: 1 TABLET ORAL at 14:40

## 2024-06-01 RX ADMIN — Medication 1 TABLET: at 08:25

## 2024-06-01 RX ADMIN — HYDROXYZINE HYDROCHLORIDE 50 MG: 50 TABLET, FILM COATED ORAL at 20:43

## 2024-06-01 RX ADMIN — NICOTINE TRANSDERMAL SYSTEM 1 PATCH: 21 PATCH, EXTENDED RELEASE TRANSDERMAL at 08:26

## 2024-06-01 NOTE — PLAN OF CARE
Goal Outcome Evaluation:  Plan of Care Reviewed With: patient  Patient Agreement with Plan of Care: agrees        Outcome Evaluation: Denied SI, HI, AVH. Calm, cooperative, and pleasant. Reports withdrawal symptoms are mild and cravings are 5/10.    Patient appeared to sleep about 8 hrs and is still sleeping.

## 2024-06-01 NOTE — PROGRESS NOTES
"INPATIENT PSYCHIATRIC PROGRESS NOTE    Name:  Van Heard  :  1990  MRN:  [id number]  Visit Number:  [id number]  Length of stay:  4    SUBJECTIVE    CC/Focus of Exam: Depression, substance use    INTERVAL HISTORY:  The patient reports he is feeling a little better though not hundred percent.    Depression rating 3/10  Anxiety rating 5/10  Sleep: good  Withdrawal sx: still a little shaky, anxious, sweaty  Cravin/10 mostly for alcohol    Review of Systems   Neurological:  Positive for tremors and weakness.   Psychiatric/Behavioral:  Positive for dysphoric mood. The patient is nervous/anxious.        OBJECTIVE    Temp:  [97.6 °F (36.4 °C)-97.9 °F (36.6 °C)] 97.6 °F (36.4 °C)  Heart Rate:  [] 67  Resp:  [16-18] 18  BP: (101-131)/(62-68) 131/68    MENTAL STATUS EXAM:  Appearance:Casually dressed, good hygeine.   Cooperation:Cooperative  Psychomotor: No psychomotor agitation/retardation, No EPS, No motor tics  Speech-normal rate, amount.  Mood \"anxious\"   Affect-congruent, appropriate, stable  Thought Content-goal directed, no delusional material present  Thought process-linear, organized.  Suicidality: No SI  Homicidality: No HI  Perception: No AH/VH  Insight-fair   Judgement-fair    Lab Results (last 24 hours)       ** No results found for the last 24 hours. **               Imaging Results (Last 24 Hours)       ** No results found for the last 24 hours. **               ECG/EMG Results (most recent)       Procedure Component Value Units Date/Time    ECG 12 Lead Other; Baseline Cardiac Status [[id number]] Collected: 24 1607     Updated: 24 0947     QT Interval 418 ms      QTC Interval 463 ms     Narrative:      Test Reason : Other~  Blood Pressure :   */*   mmHG  Vent. Rate :  74 BPM     Atrial Rate :  74 BPM     P-R Int : 120 ms          QRS Dur : 140 ms      QT Int : 418 ms       P-R-T Axes :  55  25  83 degrees     QTc Int : 463 ms    Normal sinus rhythm  Right bundle branch " block  Abnormal ECG  When compared with ECG of 28-MAY-2024 16:07, (Unconfirmed)  No significant change was found  Confirmed by Ronni Foster () on 2024 9:46:46 AM    Referred By: NAA           Confirmed By: Ronni Foster             ALLERGIES: Patient has no known allergies.    Medication Review:   Scheduled Medications:  B-complex with vitamin C, 2 tablet, Oral, Daily  LORazepam, 1 mg, Oral, 3 times per day   Followed by  [START ON 2024] LORazepam, 0.5 mg, Oral, 3 times per day  multivitamin with minerals, 1 tablet, Oral, Daily  nicotine, 1 patch, Transdermal, Q24H  thiamine, 100 mg, Oral, Daily         PRN Medications:    acetaminophen    aluminum-magnesium hydroxide-simethicone    benzonatate    famotidine    hydrOXYzine    ibuprofen    loperamide    [] LORazepam **FOLLOWED BY** [] LORazepam **FOLLOWED BY** LORazepam **FOLLOWED BY** [START ON 2024] LORazepam    magnesium hydroxide    ondansetron ODT    sodium chloride    traZODone   All medications reviewed.    ASSESSMENT & PLAN:      Other recurrent depressive disorders  -Continue mirtazapine      Suicidal ideation  -SP3      Alcohol dependence with withdrawal  -Ativan detox  -Thiamine and folate      Benzodiazepine abuse  -Ativan detox      Methamphetamine abuse  -Supportive treatment      Special precautions: Special Precautions Level 3 (q15 min checks) .    Behavioral Health Treatment Plan and Problem List: I have reviewed and approved the Behavioral Health Treatment Plan and Problem list.  The patient has had a chance to review and agrees with the treatment plan.    Copied text in portions of this note has been reviewed and is accurate as of 24         Clinician:  Columba Jones MD  24  10:59 EDT

## 2024-06-01 NOTE — NURSING NOTE
Patient off unit to gym for approx 45 minutes- for exercise group with this nurse, and Yen CAMERON.  Pt tolerated well. Patient interacting approp with male peer participating in stretching, basketball, walking activities.   Verbalized positive benefits of exercise on mental health.

## 2024-06-01 NOTE — PLAN OF CARE
Goal Outcome Evaluation:  Plan of Care Reviewed With: patient  Patient Agreement with Plan of Care: agrees     Progress: improving  Outcome Evaluation: Patient reports anxiety 7 /depression 5 . Patient is calm and cooperative with staff and other patient's. Pt denies any SI/HI/AVH. Pt has no complaints on this shift. Patient interactive in nursing group.

## 2024-06-02 PROCEDURE — 99232 SBSQ HOSP IP/OBS MODERATE 35: CPT | Performed by: PSYCHIATRY & NEUROLOGY

## 2024-06-02 RX ADMIN — LORAZEPAM 0.5 MG: 0.5 TABLET ORAL at 14:35

## 2024-06-02 RX ADMIN — Medication 100 MG: at 09:16

## 2024-06-02 RX ADMIN — IBUPROFEN 400 MG: 400 TABLET, FILM COATED ORAL at 21:37

## 2024-06-02 RX ADMIN — Medication 1 TABLET: at 09:16

## 2024-06-02 RX ADMIN — Medication 2 TABLET: at 09:16

## 2024-06-02 RX ADMIN — HYDROXYZINE HYDROCHLORIDE 50 MG: 50 TABLET, FILM COATED ORAL at 21:37

## 2024-06-02 RX ADMIN — NICOTINE TRANSDERMAL SYSTEM 1 PATCH: 21 PATCH, EXTENDED RELEASE TRANSDERMAL at 09:16

## 2024-06-02 RX ADMIN — LORAZEPAM 0.5 MG: 0.5 TABLET ORAL at 09:15

## 2024-06-02 RX ADMIN — LORAZEPAM 0.5 MG: 0.5 TABLET ORAL at 21:37

## 2024-06-02 NOTE — PLAN OF CARE
Goal Outcome Evaluation:  Plan of Care Reviewed With: patient  Patient Agreement with Plan of Care: agrees        Outcome Evaluation: Patient reports anxiety 5/depression5 . Patient is calm and cooperative with staff  Pt denies any SI/HI/AVH. Pt has no complaints on this shift.  Patient verbalized positive outlook on aftercare

## 2024-06-02 NOTE — PLAN OF CARE
Goal Outcome Evaluation:  Plan of Care Reviewed With: patient  Patient Agreement with Plan of Care: agrees        Outcome Evaluation: Pt reports good appetite and sleep. A/D 6. Denies SI/HI/AVH. Slept approx. 6.5 hrs

## 2024-06-02 NOTE — PROGRESS NOTES
"INPATIENT PSYCHIATRIC PROGRESS NOTE    Name:  Van Heard  :  1990  MRN:  [id number]  Visit Number:  [id number]  Length of stay:  5    SUBJECTIVE    CC/Focus of Exam: Depression, substance use    INTERVAL HISTORY:  The patient reports he is feeling pretty good today and is hoping to be discharged tomorrow and go to a sober living facility in [place].   Depression rating 3/10  Anxiety rating 5/10  Sleep: good  Withdrawal sx: mild  Cravin/10 mostly for alcohol    Review of Systems   Constitutional: Negative.    Respiratory: Negative.     Cardiovascular: Negative.    Gastrointestinal: Negative.        OBJECTIVE    Temp:  [96.6 °F (35.9 °C)-98.1 °F (36.7 °C)] 96.6 °F (35.9 °C)  Heart Rate:  [] 81  Resp:  [16-18] 18  BP: ()/(64-82) 113/64    MENTAL STATUS EXAM:  Appearance:Casually dressed, good hygeine.   Cooperation:Cooperative  Psychomotor: No psychomotor agitation/retardation, No EPS, No motor tics  Speech-normal rate, amount.  Mood \"anxious\"   Affect-congruent, appropriate, stable  Thought Content-goal directed, no delusional material present  Thought process-linear, organized.  Suicidality: No SI  Homicidality: No HI  Perception: No AH/VH  Insight-fair   Judgement-fair    Lab Results (last 24 hours)       ** No results found for the last 24 hours. **               Imaging Results (Last 24 Hours)       ** No results found for the last 24 hours. **               ECG/EMG Results (most recent)       Procedure Component Value Units Date/Time    ECG 12 Lead Other; Baseline Cardiac Status [[id number]] Collected: 24 1607     Updated: 24 0947     QT Interval 418 ms      QTC Interval 463 ms     Narrative:      Test Reason : Other~  Blood Pressure :   */*   mmHG  Vent. Rate :  74 BPM     Atrial Rate :  74 BPM     P-R Int : 120 ms          QRS Dur : 140 ms      QT Int : 418 ms       P-R-T Axes :  55  25  83 degrees     QTc Int : 463 ms    Normal sinus rhythm  Right bundle branch " block  Abnormal ECG  When compared with ECG of 28-MAY-2024 16:07, (Unconfirmed)  No significant change was found  Confirmed by Ronni Foster () on 2024 9:46:46 AM    Referred By: NAA           Confirmed By: Ronni Foster             ALLERGIES: Patient has no known allergies.    Medication Review:   Scheduled Medications:  B-complex with vitamin C, 2 tablet, Oral, Daily  LORazepam, 0.5 mg, Oral, 3 times per day  multivitamin with minerals, 1 tablet, Oral, Daily  nicotine, 1 patch, Transdermal, Q24H  thiamine, 100 mg, Oral, Daily         PRN Medications:    acetaminophen    aluminum-magnesium hydroxide-simethicone    benzonatate    famotidine    hydrOXYzine    ibuprofen    loperamide    [] LORazepam **FOLLOWED BY** [] LORazepam **FOLLOWED BY** [] LORazepam **FOLLOWED BY** LORazepam    magnesium hydroxide    ondansetron ODT    sodium chloride    traZODone   All medications reviewed.    ASSESSMENT & PLAN:      Other recurrent depressive disorders  -Continue mirtazapine      Suicidal ideation  -SP3      Alcohol dependence with withdrawal  -Ativan detox  -Thiamine and folate      Benzodiazepine abuse  -Ativan detox      Methamphetamine abuse  -Supportive treatment      Special precautions: Special Precautions Level 3 (q15 min checks) .    Behavioral Health Treatment Plan and Problem List: I have reviewed and approved the Behavioral Health Treatment Plan and Problem list.  The patient has had a chance to review and agrees with the treatment plan.    Copied text in portions of this note has been reviewed and is accurate as of 24         Clinician:  Columba Jones MD  24  12:52 EDT

## 2024-06-03 VITALS
SYSTOLIC BLOOD PRESSURE: 141 MMHG | OXYGEN SATURATION: 99 % | TEMPERATURE: 96.9 F | HEART RATE: 66 BPM | DIASTOLIC BLOOD PRESSURE: 88 MMHG | RESPIRATION RATE: 18 BRPM | HEIGHT: 72 IN | BODY MASS INDEX: 18.45 KG/M2 | WEIGHT: 136.2 LBS

## 2024-06-03 PROBLEM — R45.851 SUICIDAL IDEATION: Status: RESOLVED | Noted: 2024-05-28 | Resolved: 2024-06-03

## 2024-06-03 PROCEDURE — 99239 HOSP IP/OBS DSCHRG MGMT >30: CPT | Performed by: PSYCHIATRY & NEUROLOGY

## 2024-06-03 RX ADMIN — Medication 2 TABLET: at 08:27

## 2024-06-03 RX ADMIN — NICOTINE TRANSDERMAL SYSTEM 1 PATCH: 21 PATCH, EXTENDED RELEASE TRANSDERMAL at 08:27

## 2024-06-03 RX ADMIN — Medication 100 MG: at 08:27

## 2024-06-03 RX ADMIN — Medication 1 TABLET: at 08:27

## 2024-06-03 NOTE — DISCHARGE INSTR - APPOINTMENTS
[facility]  [address] , [place] [address]   [phone]    6/3/2024

## 2024-06-03 NOTE — DISCHARGE SUMMARY
"      PSYCHIATRIC DISCHARGE SUMMARY     Patient Identification:  Name:  Van Heard  Age:  34 y.o.  Sex:  male  :  1990  MRN:  [id number]  Visit Number:  [id number]    Date of Admission:2024   Date of Discharge: 6/3/2024     Discharge Diagnosis:  Principal Problem:    Other recurrent depressive disorders  Active Problems:    Alcohol dependence with withdrawal    Methamphetamine abuse    Benzodiazepine abuse      Admission Diagnosis:  Suicidal ideation [R45.851]     Hospital Course  Patient is a 34 y.o. male presented with SI in the setting of relapse on illicit substances.  Admitted for crisis stabilization.  Patient started on Ativan detox protocol as well as mirtazapine for depressive symptoms.  Patient completed detox protocol appropriately with no acute setbacks.  He was a good participant in the milieu and daily sessions.  He tolerated medication changes and reported improvement of symptoms.  Patient denied further SI and voiced readiness for discharge, agreeable to referrals to sober living facilities.  Patient plans to attend sober [facility] and has arranged for a friend to transport him there.  Patient appropriate for discharge at this time.    By the conclusion of this hospitalization, patient is exhibiting no acutely concerning symptoms of mood, psychotic or thought disorder that would necessitate further inpatient care. Patient is also denying SI, HI, and AVH. Patient has shown improvement of presenting symptoms, exhibited no behavior concerning for harm to self or others, and is considered appropriate for discharge to a lower level of care today. Treatment and safe discharge planning completed. Outpatient care ascertained.     Mental Status Exam upon discharge:   Mood \"better\"   Affect-congruent, appropriate, stable  Thought Content-goal directed, no delusional material present  Thought process-linear, organized.  Suicidality: No SI  Homicidality: No HI  Perception: No " /VH    Procedures Performed         Consults:   Consults       No orders found from 4/29/2024 to 5/29/2024.            Pertinent Test Results:   Lab Results (last 7 days)       Procedure Component Value Units Date/Time    Hepatitis Panel, Acute [[id number]]  (Abnormal) Collected: 05/28/24 1406    Specimen: Blood Updated: 05/28/24 1557     Hepatitis B Surface Ag Non-Reactive     Hep A IgM Non-Reactive     Hep B C IgM Non-Reactive     Hepatitis C Ab Reactive    Narrative:      Results may be falsely decreased if patient taking Biotin.             Condition on Discharge:  improved    Vital Signs  Temp:  [96.9 °F (36.1 °C)-98.8 °F (37.1 °C)] 96.9 °F (36.1 °C)  Heart Rate:  [52-97] 66  Resp:  [16-18] 18  BP: (108-141)/(67-88) 141/88    Discharge Disposition:  Home or Self Care    Discharge Medications:     Discharge Medications      Patient Not Prescribed Medications Upon Discharge         Discharge Diet: Normal  Diet Instructions    Regular diet           Activity at Discharge: Normal  Activity Instructions    As tolerated           Follow-up Appointments  No future appointments.      Test Results Pending at Discharge  None     Time: I spent greater than 30 minutes on this discharge activity which included: face-to-face encounter with the patient, reviewing the data in the system, coordination of the care with the nursing staff as well as consultants, documentation, and entering orders.      Clinician:   Chandan Juarez MD  06/03/24  13:24 EDT

## 2024-06-03 NOTE — PLAN OF CARE
Goal Outcome Evaluation:  Plan of Care Reviewed With: patient  Patient Agreement with Plan of Care: agrees     Progress: improving  Outcome Evaluation: Pt reports anxiety and depression 4/10. Pt denies SI/HI/AVH. Pt reports good sleep and appetite.

## 2024-06-03 NOTE — PROGRESS NOTES
Behavioral Health Discharge Summary             Please fax within 24 hours of discharge to [facility] at: [phone]      Member Name: Van Heard Member ID: [id number]   Authorization Number: [id number] Phone: [phone]   Member Address: [address] [address]   Discharge Date: 06/03/2024 Level of Care at Discharge:    Facility: [facility] Staff Completing Form: Nicole ROBERTSON   If the member is being discharged directly to a residential or extended care program, please specify the type below.   __Private Child-Caring Facility (PCC) Residential/Group Home   __Private Child-Caring Facility (PCC) Therapeutic Foster Care   __Residential Treatment Facility (RTF)   __Psychiatric Residential Treatment Facility (PRTF I or II)   __Long-Term Acute Inpatient Hospital Services or Extended Care Unit (ECU)   __Other (please specify):    Brief discharge summary of treatment received (for follow up by the case management team): D/C clinical with list of medications and follow up appts given to patient upon discharge.     BRIEF SUMMARY OF RECOMMENDATIONS FOR ONGOING TREATMENT     Discharged to where: [facility]   Discharge diagnoses: F 33.8   Axis I:    Axis II:    Axis III:    Axis IV:    Axis V:    Does the member understand his/her DX?  Yes          Medication     Dose     Schedule Supply/  Quantity  Given at Discharge RX Provided  Yes/No  If Rx Provided, Quantity RX Prior Auth Required  Yes/No Prior Auth  Completed                                                                                             Does the member understand the reason for taking these medications? Yes                                                           FOLLOW-UP APPOINTMENTS   Please schedule within 7 days of discharge and provide appointment details for all referred services.    PCP/Other Providers Involved in Treatment:    Appointment Type: IP REHAB Provider Name: [facility]   Provider Phone: [phone] Appointment Date: 06/03/2024 Appointment Time:  Admit upon arrival     Assessment   (new to OP services)        Case Management    Is the member already enrolled in case management?  Yes/No  If yes, date the CM was notified:    If no, was the CM referral offered?  Yes/No  Accepted? Yes/No    Is the Release of Information in the chart? Yes/No:      Medication Management (for member discharged with psychiatric medications):      A&D Treatment (for member with substance abuse/   dependence in the past year):      Medical Condition (for member with a medical condition):    Other recommended treatment:    Do you have any concerns about the discharge plan?  No    If yes, explain:    Was the member involved in the discharge planning?  Yes    If no, explain:    Was a copy of the discharge plan provided to the member?  Yes    If no, explain:

## 2024-06-03 NOTE — PLAN OF CARE
Goal Outcome Evaluation:  Plan of Care Reviewed With: patient, mother  Patient Agreement with Plan of Care: agrees  Consent Given to Review Plan with: pt. rates anxiety 4 denies depression denies s/i denies h/i pt. verbalzies medicaitons are helping he is going to longer teerm treatment Rehab .

## 2024-06-03 NOTE — PROGRESS NOTES
Discharge Planning Assessment  [facility]     Patient Name: Van Heard  MRN: [id number]  Today's Date: 6/3/2024    Admit Date: 5/28/2024    Patient is being discharged today. He denies SI, HI, and AVH. He appears future oriented and motivated. He reports that he has been talking with his sponsor who has known him for years and that this individual has encouraged him to go to [facility] in [place]. This is his plan at this time. He has a friend who plans to pick him up and take him there. Patient has informed his mother of his plans and states that he has a great support system behind him. He was educated about the crisis hotline, when to call 911 or present to the nearest emergency room. He also plans to keep in touch with [facility] in order to receive case management assistance. He was encouraged to be compliant with his aftercare plan.         MJ Trejo